# Patient Record
Sex: FEMALE | Race: WHITE | Employment: OTHER | URBAN - METROPOLITAN AREA
[De-identification: names, ages, dates, MRNs, and addresses within clinical notes are randomized per-mention and may not be internally consistent; named-entity substitution may affect disease eponyms.]

---

## 2018-01-15 ENCOUNTER — HOSPITAL ENCOUNTER (EMERGENCY)
Age: 23
Discharge: HOME | End: 2018-01-15
Attending: EMERGENCY MEDICINE
Payer: COMMERCIAL

## 2018-01-15 ENCOUNTER — APPOINTMENT (OUTPATIENT)
Dept: ULTRASOUND IMAGING | Age: 23
End: 2018-01-15
Attending: PHYSICIAN ASSISTANT
Payer: COMMERCIAL

## 2018-01-15 VITALS
DIASTOLIC BLOOD PRESSURE: 77 MMHG | SYSTOLIC BLOOD PRESSURE: 154 MMHG | BODY MASS INDEX: 30.06 KG/M2 | HEART RATE: 92 BPM | TEMPERATURE: 98.1 F | OXYGEN SATURATION: 100 % | RESPIRATION RATE: 16 BRPM | HEIGHT: 62 IN | WEIGHT: 163.36 LBS

## 2018-01-15 DIAGNOSIS — R10.11 RUQ ABDOMINAL PAIN: Primary | ICD-10-CM

## 2018-01-15 LAB
ALBUMIN SERPL-MCNC: 3.9 G/DL (ref 3.5–5)
ALBUMIN/GLOB SERPL: 1 {RATIO} (ref 1.1–2.2)
ALP SERPL-CCNC: 73 U/L (ref 45–117)
ALT SERPL-CCNC: 20 U/L (ref 12–78)
ANION GAP SERPL CALC-SCNC: 6 MMOL/L (ref 5–15)
APPEARANCE UR: CLEAR
AST SERPL-CCNC: 19 U/L (ref 15–37)
BACTERIA URNS QL MICRO: ABNORMAL /HPF
BASOPHILS # BLD: 0 K/UL (ref 0–0.1)
BASOPHILS NFR BLD: 0 % (ref 0–1)
BILIRUB SERPL-MCNC: 0.3 MG/DL (ref 0.2–1)
BILIRUB UR QL: NEGATIVE
BUN SERPL-MCNC: 9 MG/DL (ref 6–20)
BUN/CREAT SERPL: 11 (ref 12–20)
CALCIUM SERPL-MCNC: 9.3 MG/DL (ref 8.5–10.1)
CHLORIDE SERPL-SCNC: 106 MMOL/L (ref 97–108)
CO2 SERPL-SCNC: 28 MMOL/L (ref 21–32)
COLOR UR: ABNORMAL
CREAT SERPL-MCNC: 0.84 MG/DL (ref 0.55–1.02)
EOSINOPHIL # BLD: 0.1 K/UL (ref 0–0.4)
EOSINOPHIL NFR BLD: 1 % (ref 0–7)
EPITH CASTS URNS QL MICRO: ABNORMAL /LPF
ERYTHROCYTE [DISTWIDTH] IN BLOOD BY AUTOMATED COUNT: 13.1 % (ref 11.5–14.5)
GLOBULIN SER CALC-MCNC: 3.8 G/DL (ref 2–4)
GLUCOSE SERPL-MCNC: 70 MG/DL (ref 65–100)
GLUCOSE UR STRIP.AUTO-MCNC: NEGATIVE MG/DL
HCG UR QL: NEGATIVE
HCT VFR BLD AUTO: 39.6 % (ref 35–47)
HGB BLD-MCNC: 13.4 G/DL (ref 11.5–16)
HGB UR QL STRIP: NEGATIVE
KETONES UR QL STRIP.AUTO: NEGATIVE MG/DL
LEUKOCYTE ESTERASE UR QL STRIP.AUTO: ABNORMAL
LIPASE SERPL-CCNC: 104 U/L (ref 73–393)
LYMPHOCYTES # BLD: 3.6 K/UL (ref 0.8–3.5)
LYMPHOCYTES NFR BLD: 41 % (ref 12–49)
MCH RBC QN AUTO: 29.5 PG (ref 26–34)
MCHC RBC AUTO-ENTMCNC: 33.8 G/DL (ref 30–36.5)
MCV RBC AUTO: 87 FL (ref 80–99)
MONOCYTES # BLD: 0.8 K/UL (ref 0–1)
MONOCYTES NFR BLD: 9 % (ref 5–13)
NEUTS SEG # BLD: 4.3 K/UL (ref 1.8–8)
NEUTS SEG NFR BLD: 49 % (ref 32–75)
NITRITE UR QL STRIP.AUTO: NEGATIVE
PH UR STRIP: 5.5 [PH] (ref 5–8)
PLATELET # BLD AUTO: 326 K/UL (ref 150–400)
POTASSIUM SERPL-SCNC: 4 MMOL/L (ref 3.5–5.1)
PROT SERPL-MCNC: 7.7 G/DL (ref 6.4–8.2)
PROT UR STRIP-MCNC: NEGATIVE MG/DL
RBC # BLD AUTO: 4.55 M/UL (ref 3.8–5.2)
RBC #/AREA URNS HPF: ABNORMAL /HPF (ref 0–5)
SODIUM SERPL-SCNC: 140 MMOL/L (ref 136–145)
SP GR UR REFRACTOMETRY: 1.02 (ref 1–1.03)
UA: UC IF INDICATED,UAUC: ABNORMAL
UROBILINOGEN UR QL STRIP.AUTO: 0.2 EU/DL (ref 0.2–1)
WBC # BLD AUTO: 8.7 K/UL (ref 3.6–11)
WBC URNS QL MICRO: ABNORMAL /HPF (ref 0–4)

## 2018-01-15 PROCEDURE — 76705 ECHO EXAM OF ABDOMEN: CPT

## 2018-01-15 PROCEDURE — 85025 COMPLETE CBC W/AUTO DIFF WBC: CPT | Performed by: EMERGENCY MEDICINE

## 2018-01-15 PROCEDURE — 81025 URINE PREGNANCY TEST: CPT | Performed by: EMERGENCY MEDICINE

## 2018-01-15 PROCEDURE — 80053 COMPREHEN METABOLIC PANEL: CPT | Performed by: EMERGENCY MEDICINE

## 2018-01-15 PROCEDURE — 36415 COLL VENOUS BLD VENIPUNCTURE: CPT | Performed by: EMERGENCY MEDICINE

## 2018-01-15 PROCEDURE — 81001 URINALYSIS AUTO W/SCOPE: CPT | Performed by: EMERGENCY MEDICINE

## 2018-01-15 PROCEDURE — 83690 ASSAY OF LIPASE: CPT | Performed by: EMERGENCY MEDICINE

## 2018-01-15 PROCEDURE — 87086 URINE CULTURE/COLONY COUNT: CPT | Performed by: EMERGENCY MEDICINE

## 2018-01-15 PROCEDURE — 99283 EMERGENCY DEPT VISIT LOW MDM: CPT

## 2018-01-15 RX ORDER — HYDROCODONE BITARTRATE AND ACETAMINOPHEN 5; 325 MG/1; MG/1
1 TABLET ORAL
Qty: 10 TAB | Refills: 0 | Status: SHIPPED | OUTPATIENT
Start: 2018-01-15

## 2018-01-15 RX ORDER — ONDANSETRON 4 MG/1
4 TABLET, ORALLY DISINTEGRATING ORAL
Qty: 20 TAB | Refills: 0 | Status: SHIPPED | OUTPATIENT
Start: 2018-01-15

## 2018-01-15 RX ORDER — OMEPRAZOLE 10 MG/1
10 CAPSULE, DELAYED RELEASE ORAL DAILY
Qty: 30 CAP | Refills: 0 | Status: SHIPPED | OUTPATIENT
Start: 2018-01-15

## 2018-01-15 NOTE — LETTER
[address] 
[facility] EMERGENCY DEPT 
[address] [place] [address] [address] [id number] [phone] Work/School Note Date: 1/15/2018 To Whom It May concern: 
 
Tea Jacinto was seen and treated today in the emergency room by the following provider(s): 
Attending Provider: Ren Marks DO Physician Assistant: Danielle Valerio. [place], [place]. Tea Jacinto may return to work on 1/18/2018. Sincerely, 
 
 
 
 
Danielle Valerio.  [place], [place]

## 2018-01-16 NOTE — ED NOTES
Discharge instructions reviewed with patient. Discharge instructions given to patient per [facility]. Patient able to return/verbalize discharge instructions. Copy of discharge instructions provided. Patient condition stable, respiratory status within normal limits, neuro status intact. Ambulatory out of ER, accompanied by family.

## 2018-01-16 NOTE — ED PROVIDER NOTES
EMERGENCY DEPARTMENT HISTORY AND PHYSICAL EXAM      Date: 1/15/2018  Patient Name: John Orozco    History of Presenting Illness     Chief Complaint   Patient presents with    Abdominal Pain     RUQ pain x one month with n/v/d; worse after eating     History Provided By: Patient    HPI: John Orozco, 25 y.o. female with no significant PMHx, presents ambulatory to the ED with cc of gradually worsening abdominal pain alongside nausea with associated emesis x one month. Pt informs her abdominal pain presents with a mild 4/10 intensity and an associated sore, aching sensation to the right upper quadrant of the abdomen. Pt reports associated nausea which is constant and associated emesis last night. Pt reports her pain is generally increased following PO. Pt notes nothing works to decrease her pain. Pt additionally informs of intermittent chills. Pt reports she has not been evaluated for the pain prior. Pt states while the pain has been present for the past month, it was significantly exacerbated last night. Of note, pt reports history of chronic constipation as a child. Pt reports her last BM was yesterday which was softer than baseline. Pt states her LMP was x December 2017 and reports her BCP makes her MP irregular. Pt specifically denies any fevers, chills, diarrhea, constipation, urinary complications, chest pain, or SOB. Social Hx: - EtOH; - Smoker; - Illicit Drugs    PCP: Trice Pearce MD    There are no other complaints, changes, or physical findings at this time. Current Outpatient Prescriptions   Medication Sig Dispense Refill    HYDROcodone-acetaminophen (NORCO) 5-325 mg per tablet Take 1 Tab by mouth every four (4) hours as needed for Pain. Max Daily Amount: 6 Tabs. 10 Tab 0    omeprazole (PRILOSEC) 10 mg capsule Take 1 Cap by mouth daily. 30 Cap 0    ondansetron (ZOFRAN ODT) 4 mg disintegrating tablet Take 1 Tab by mouth every eight (8) hours as needed for Nausea.  20 Tab 0    cefdinir (OMNICEF) 300 mg capsule Take 600 mg by mouth daily.  norgestimate-ethinyl estradiol (ORTHO-CYCLEN) 0.25-35 mg-mcg per tablet Take 1 Tab by mouth daily.  amoxicillin-clavulanate (AUGMENTIN) 875-125 mg per tablet Take 1 Tab by mouth two (2) times a day. 14 Tab 0     Past History     Past Medical History:  No past medical history on file. Past Surgical History:  No past surgical history on file. Family History:  No family history on file. Social History:  Social History   Substance Use Topics    Smoking status: Not on file    Smokeless tobacco: Not on file    Alcohol use Not on file     Allergies: Allergies   Allergen Reactions    Codeine Nausea and Vomiting       Review of Systems   Review of Systems   Constitutional: Positive for chills. Negative for activity change, appetite change, diaphoresis, fever and unexpected weight change. HENT: Negative for congestion, hearing loss, rhinorrhea, sinus pressure, sneezing, sore throat and trouble swallowing. Eyes: Negative for pain, redness, itching and visual disturbance. Respiratory: Negative for cough, shortness of breath and wheezing. Cardiovascular: Negative for chest pain, palpitations and leg swelling. Gastrointestinal: Positive for abdominal pain, nausea and vomiting. Negative for constipation and diarrhea. Genitourinary: Negative for dysuria, frequency, hematuria and urgency. Musculoskeletal: Negative for arthralgias, gait problem and myalgias. Skin: Negative for color change, pallor, rash and wound. Neurological: Negative for tremors, weakness, light-headedness, numbness and headaches. All other systems reviewed and are negative. Physical Exam   Physical Exam   Constitutional: She is oriented to person, place, and time. Vital signs are normal. She appears well-developed and well-nourished. No distress. 25 y.o. female in NAD  Communicates appropriately and in full sentences.  Normal vital signs    Appears comfortable in exam bed   HENT:   Head: Normocephalic and atraumatic. Eyes: Conjunctivae are normal. Pupils are equal, round, and reactive to light. Right eye exhibits no discharge. Left eye exhibits no discharge. Neck: Normal range of motion. Neck supple. No nuchal rigidity   Cardiovascular: Normal rate, regular rhythm and intact distal pulses. Pulmonary/Chest: Effort normal and breath sounds normal. No respiratory distress. She has no wheezes. Abdominal: Soft. Bowel sounds are normal. She exhibits no distension. There is no tenderness (none with light and deep palpation). There is no rigidity, no rebound, no guarding, no CVA tenderness, no tenderness at McBurney's point and negative Meléndez's sign. Negative Meléndez's sign   Musculoskeletal: Normal range of motion. She exhibits no edema, tenderness or deformity. No neurologic, motor, vascular, or compartment embarrassment observed on exam. No focal neurologic deficits. Neurological: She is alert and oriented to person, place, and time. Coordination normal.   Skin: Skin is warm and dry. No rash noted. She is not diaphoretic. No erythema. No pallor. Psychiatric: She has a normal mood and affect. Nursing note and vitals reviewed.     Diagnostic Study Results     Labs -     Recent Results (from the past 12 hour(s))   URINALYSIS W/ REFLEX CULTURE    Collection Time: 01/15/18  5:41 PM   Result Value Ref Range    Color YELLOW/STRAW      Appearance CLEAR CLEAR      Specific gravity 1.023 1.003 - 1.030      pH (UA) 5.5 5.0 - 8.0      Protein NEGATIVE  NEG mg/dL    Glucose NEGATIVE  NEG mg/dL    Ketone NEGATIVE  NEG mg/dL    Bilirubin NEGATIVE  NEG      Blood NEGATIVE  NEG      Urobilinogen 0.2 0.2 - 1.0 EU/dL    Nitrites NEGATIVE  NEG      Leukocyte Esterase TRACE (A) NEG      WBC 5-10 0 - 4 /hpf    RBC 5-10 0 - 5 /hpf    Epithelial cells FEW FEW /lpf    Bacteria 1+ (A) NEG /hpf    UA:UC IF INDICATED URINE CULTURE ORDERED (A) CNI     HCG URINE, QL    Collection Time: 01/15/18 5:41 PM   Result Value Ref Range    HCG urine, Ql. NEGATIVE  NEG     METABOLIC PANEL, COMPREHENSIVE    Collection Time: 01/15/18  5:57 PM   Result Value Ref Range    Sodium 140 136 - 145 mmol/L    Potassium 4.0 3.5 - 5.1 mmol/L    Chloride 106 97 - 108 mmol/L    CO2 28 21 - 32 mmol/L    Anion gap 6 5 - 15 mmol/L    Glucose 70 65 - 100 mg/dL    BUN 9 6 - 20 MG/DL    Creatinine 0.84 0.55 - 1.02 MG/DL    BUN/Creatinine ratio 11 (L) 12 - 20      GFR est AA >60 >60 ml/min/1.73m2    GFR est non-AA >60 >60 ml/min/1.73m2    Calcium 9.3 8.5 - 10.1 MG/DL    Bilirubin, total 0.3 0.2 - 1.0 MG/DL    ALT (SGPT) 20 12 - 78 U/L    AST (SGOT) 19 15 - 37 U/L    Alk. phosphatase 73 45 - 117 U/L    Protein, total 7.7 6.4 - 8.2 g/dL    Albumin 3.9 3.5 - 5.0 g/dL    Globulin 3.8 2.0 - 4.0 g/dL    A-G Ratio 1.0 (L) 1.1 - 2.2     CBC WITH AUTOMATED DIFF    Collection Time: 01/15/18  5:57 PM   Result Value Ref Range    WBC 8.7 3.6 - 11.0 K/uL    RBC 4.55 3.80 - 5.20 M/uL    HGB 13.4 11.5 - 16.0 g/dL    HCT 39.6 35.0 - 47.0 %    MCV 87.0 80.0 - 99.0 FL    MCH 29.5 26.0 - 34.0 PG    MCHC 33.8 30.0 - 36.5 g/dL    RDW 13.1 11.5 - 14.5 %    PLATELET 542 773 - 293 K/uL    NEUTROPHILS 49 32 - 75 %    LYMPHOCYTES 41 12 - 49 %    MONOCYTES 9 5 - 13 %    EOSINOPHILS 1 0 - 7 %    BASOPHILS 0 0 - 1 %    ABS. NEUTROPHILS 4.3 1.8 - 8.0 K/UL    ABS. LYMPHOCYTES 3.6 (H) 0.8 - 3.5 K/UL    ABS. MONOCYTES 0.8 0.0 - 1.0 K/UL    ABS. EOSINOPHILS 0.1 0.0 - 0.4 K/UL    ABS. BASOPHILS 0.0 0.0 - 0.1 K/UL   LIPASE    Collection Time: 01/15/18  5:57 PM   Result Value Ref Range    Lipase 104 73 - 393 U/L     Radiologic Studies -   US ABD LTD   Final Result          INDICATION:  RUQ pain, no fever, no elevated WBC      EXAM: US Abdomen Limited.     FINDINGS: Abdomen sonogram of the right upper quadrant is performed. The  gallbladder appears normal, with no stones, wall thickening or associated  tenderness. The bile ducts are not enlarged.  Liver demonstrates normal, uniform  echotexture. Pancreatic head is obscured by bowel gas. Right kidney appears  normal. There is no free fluid in the hepatorenal fossa.     IMPRESSION  IMPRESSION: Unremarkable right upper quadrant sonogram.     Medical Decision Making   I am the first provider for this patient. I reviewed the vital signs, available nursing notes, past medical history, past surgical history, family history and social history. Vital Signs-Reviewed the patient's vital signs. Patient Vitals for the past 12 hrs:   Temp Pulse Resp BP SpO2   01/15/18 1934 98.1 °F (36.7 °C) 92 16 154/77 100 %   01/15/18 1724 98.6 °F (37 °C) 88 18 146/75 100 %     Pulse Oximetry Analysis - 100% on RA    Cardiac Monitor:   Rate: 88 bpm  Rhythm: Normal Sinus Rhythm      Records Reviewed: Nursing Notes and Old Medical Records    Provider Notes (Medical Decision Making):   DDx: gallbladder disease, reflux, dehydration, electrolyte abnormality, poor diet choices, biliary sludge     Pt presents with RUQ pain, N/V x 1 month which is worse following meals. Will evaluate with labs and US of RUQ. Labs, VSS, and imaging unremarkable. Will discharge pt with close general surgery follow-up. Pt expresses understanding. Provided customary ED return precautions. ED Course:   Initial assessment performed. The patients presenting problems have been discussed, and they are in agreement with the care plan formulated and outlined with them. I have encouraged them to ask questions as they arise throughout their visit. Disposition:  DISCHARGE NOTE:    8:17 PM  The patient is ready for discharge. The patient signs, symptoms, diagnosis, and discharge instructions have been discussed and the patient has conveyed their understanding. The patient is to follow-up as reccommended or returned to the ER should their symptoms worsen. Plan has been discussed and patient is in agreement. PLAN:  1.    Current Discharge Medication List      START taking these medications    Details   HYDROcodone-acetaminophen (NORCO) 5-325 mg per tablet Take 1 Tab by mouth every four (4) hours as needed for Pain. Max Daily Amount: 6 Tabs. Qty: 10 Tab, Refills: 0    Associated Diagnoses: RUQ abdominal pain      omeprazole (PRILOSEC) 10 mg capsule Take 1 Cap by mouth daily. Qty: 30 Cap, Refills: 0      ondansetron (ZOFRAN ODT) 4 mg disintegrating tablet Take 1 Tab by mouth every eight (8) hours as needed for Nausea. Qty: 20 Tab, Refills: 0         STOP taking these medications       HYDROcodone-acetaminophen (LORTAB) 5-500 mg per tablet Comments:   Reason for Stopp[place].   Follow-up Information     Follow up With Details Comments Contact Info    Jose Antonio Cordova MD Schedule an appointment as soon as possible for a visit in 2 days As needed, If symptoms worsen, Possible further evaluation and treatment [place] [phone]      [facility] EMERGENCY DEPT Go to As needed, If symptoms worsen [address] [facility] [address] Candi Erickson MD Schedule an appointment as soon as possible for a visit in 2 days As needed, If symptoms worsen, Possible further evaluation and treatment [address] [facility] [address]  [phone]          Return to ED if worse     Diagnosis     Clinical Impression:   1. RUQ abdominal pain      Attestations: This note is prepared by Saumya Hernandez, acting as Scribe for ArsanisVIVIEN. ArsanisVIVIEN: The scribe's documentation has been prepared under my direction and personally reviewed by me in its entirety. I confirm that the note above accurately reflects all work, treatment, procedures, and medical decision making performed by me.

## 2018-01-16 NOTE — DISCHARGE INSTRUCTIONS
Abdominal Pain: Care Instructions  Your Care Instructions    Abdominal pain has many possible causes. Some aren't serious and get better on their own in a few days. Others need more testing and treatment. If your pain continues or gets worse, you need to be rechecked and may need more tests to find out what is wrong. You may need surgery to correct the problem. Don't ignore new symptoms, such as fever, nausea and vomiting, urination problems, pain that gets worse, and dizziness. These may be signs of a more serious problem. Your doctor may have recommended a follow-up visit in the next 8 to 12 hours. If you are not getting better, you may need more tests or treatment. The doctor has checked you carefully, but problems can develop later. If you notice any problems or new symptoms, get medical treatment right away. Follow-up care is a key part of your treatment and safety. Be sure to make and go to all appointments, and call your doctor if you are having problems. It's also a good idea to know your test results and keep a list of the medicines you take. How can you care for yourself at home? · Rest until you feel better. · To prevent dehydration, drink plenty of fluids, enough so that your urine is light yellow or clear like water. Choose water and other caffeine-free clear liquids until you feel better. If you have kidney, heart, or liver disease and have to limit fluids, talk with your doctor before you increase the amount of fluids you drink. · If your stomach is upset, eat mild foods, such as rice, dry toast or crackers, bananas, and applesauce. Try eating several small meals instead of two or three large ones. · Wait until 48 hours after all symptoms have gone away before you have spicy foods, alcohol, and drinks that contain caffeine. · Do not eat foods that are high in fat. · Avoid anti-inflammatory medicines such as aspirin, ibuprofen (Advil, Motrin), and naproxen (Aleve).  These can cause stomach upset. Talk to your doctor if you take daily aspirin for another health problem. When should you call for help? Call 911 anytime you think you may need emergency care. For example, call if:  ? · You passed out (lost consciousness). ? · You pass maroon or very bloody stools. ? · You vomit blood or what looks like coffee grounds. ? · You have new, severe belly pain. ?Call your doctor now or seek immediate medical care if:  ? · Your pain gets worse, especially if it becomes focused in one area of your belly. ? · You have a new or higher fever. ? · Your stools are black and look like tar, or they have streaks of blood. ? · You have unexpected vaginal bleeding. ? · You have symptoms of a urinary tract infection. These may include:  ¨ Pain when you urinate. ¨ Urinating more often than usual.  ¨ Blood in your urine. ? · You are dizzy or lightheaded, or you feel like you may faint. ? Watch closely for changes in your health, and be sure to contact your doctor if:  ? · You are not getting better after 1 day (24 hours). Where can you learn more? Go to http://tiffany-esequiel.info/. Enter W296 in the search box to learn more about \"Abdominal Pain: Care Instructions. \"  Current as of: March 20, 2017  Content Version: 11.4  © 8688-9055 [employer]. Care instructions adapted under license by [employer] (which disclaims liability or warranty for this information). If you have questions about a medical condition or this instruction, always ask your healthcare professional. [place] [address] any warranty or liability for your use of this information.

## 2018-01-16 NOTE — ED NOTES
Assumed care of patient. Patient presents with chief complaint of abdominal pain. Patient states she has had diffuse abdominal pain with increased pain to RUQ after eating/drinking. Patient states she has been experiencing this pain since Saturday and has also been experiencing nausea/vomiting for 1 month. Patient states her last episode of vomiting was last evening and also reports chill, but did not take her temperature to determine if she had a fever. Patient is alert and oriented x4, respirations even and unlabored, VSS. Call bell within reach. Family at bedside.

## 2018-01-17 LAB
BACTERIA SPEC CULT: NORMAL
CC UR VC: NORMAL
SERVICE CMNT-IMP: NORMAL

## 2019-07-21 ENCOUNTER — HOSPITAL ENCOUNTER (EMERGENCY)
Age: 24
Discharge: HOME | End: 2019-07-21
Attending: EMERGENCY MEDICINE
Payer: COMMERCIAL

## 2019-07-21 VITALS
SYSTOLIC BLOOD PRESSURE: 107 MMHG | HEART RATE: 100 BPM | DIASTOLIC BLOOD PRESSURE: 84 MMHG | WEIGHT: 176.37 LBS | RESPIRATION RATE: 17 BRPM | BODY MASS INDEX: 32.46 KG/M2 | OXYGEN SATURATION: 100 % | TEMPERATURE: 97.9 F | HEIGHT: 62 IN

## 2019-07-21 DIAGNOSIS — T78.40XA ALLERGIC REACTION, INITIAL ENCOUNTER: Primary | ICD-10-CM

## 2019-07-21 PROCEDURE — 96375 TX/PRO/DX INJ NEW DRUG ADDON: CPT

## 2019-07-21 PROCEDURE — 74011000250 HC RX REV CODE- 250: Performed by: EMERGENCY MEDICINE

## 2019-07-21 PROCEDURE — 74011250637 HC RX REV CODE- 250/637: Performed by: EMERGENCY MEDICINE

## 2019-07-21 PROCEDURE — 74011250636 HC RX REV CODE- 250/636: Performed by: EMERGENCY MEDICINE

## 2019-07-21 PROCEDURE — 96374 THER/PROPH/DIAG INJ IV PUSH: CPT

## 2019-07-21 PROCEDURE — 99284 EMERGENCY DEPT VISIT MOD MDM: CPT

## 2019-07-21 PROCEDURE — 96361 HYDRATE IV INFUSION ADD-ON: CPT

## 2019-07-21 RX ORDER — DIPHENHYDRAMINE HCL 25 MG
25 CAPSULE ORAL
Qty: 20 CAP | Refills: 0 | Status: SHIPPED | OUTPATIENT
Start: 2019-07-21 | End: 2019-07-31

## 2019-07-21 RX ORDER — FAMOTIDINE 20 MG/1
20 TABLET, FILM COATED ORAL 2 TIMES DAILY
Qty: 20 TAB | Refills: 0 | Status: SHIPPED | OUTPATIENT
Start: 2019-07-21 | End: 2019-07-31

## 2019-07-21 RX ORDER — PAROXETINE 10 MG/1
20 TABLET, FILM COATED ORAL DAILY
COMMUNITY

## 2019-07-21 RX ORDER — PREDNISONE 50 MG/1
50 TABLET ORAL DAILY
Qty: 3 TAB | Refills: 0 | Status: SHIPPED | OUTPATIENT
Start: 2019-07-21 | End: 2019-07-24

## 2019-07-21 RX ORDER — DIPHENHYDRAMINE HYDROCHLORIDE 50 MG/ML
25 INJECTION, SOLUTION INTRAMUSCULAR; INTRAVENOUS
Status: COMPLETED | OUTPATIENT
Start: 2019-07-21 | End: 2019-07-21

## 2019-07-21 RX ORDER — LORAZEPAM 1 MG/1
1 TABLET ORAL
Status: COMPLETED | OUTPATIENT
Start: 2019-07-21 | End: 2019-07-21

## 2019-07-21 RX ADMIN — FAMOTIDINE 20 MG: 10 INJECTION, SOLUTION INTRAVENOUS at 01:57

## 2019-07-21 RX ADMIN — LORAZEPAM 1 MG: 1 TABLET ORAL at 03:30

## 2019-07-21 RX ADMIN — METHYLPREDNISOLONE SODIUM SUCCINATE 125 MG: 125 INJECTION, POWDER, FOR SOLUTION INTRAMUSCULAR; INTRAVENOUS at 01:57

## 2019-07-21 RX ADMIN — DIPHENHYDRAMINE HYDROCHLORIDE 25 MG: 50 INJECTION, SOLUTION INTRAMUSCULAR; INTRAVENOUS at 01:57

## 2019-07-21 RX ADMIN — SODIUM CHLORIDE 1000 ML: 900 INJECTION, SOLUTION INTRAVENOUS at 01:57

## 2019-07-21 NOTE — ED NOTES
ED visit d/t allergic reaction, nuts - pt reports eating food with nuts without knowing - event occurred 30 mins PTA - pt took benadryl x3, - pt also reports vomiting en route into the ED - reports relief at this time     Pt's boyfriend at bedside for comfort and care;;

## 2019-07-21 NOTE — ED NOTES
Patient discharge by Heike Temple MD - pt sent to the front lobby, with strong and steady gait -  Discharge information / home RX / and reasons to return to the ED were reviewed by the doctor.

## 2019-07-21 NOTE — ED NOTES
Pt with strong and steady gait to and from the bathroom  Family members remain at bedside for comfort and care;

## 2019-07-21 NOTE — DISCHARGE INSTRUCTIONS
Patient Education        Allergic Reaction: Care Instructions  Your Care Instructions    An allergic reaction is an excessive response from your immune system to a medicine, chemical, food, insect bite, or other substance. A reaction can range from mild to life-threatening. Some people have a mild rash, hives, and itching or stomach cramps. In severe reactions, swelling of your tongue and throat can close up your airway so that you cannot breathe. Follow-up care is a key part of your treatment and safety. Be sure to make and go to all appointments, and call your doctor if you are having problems. It's also a good idea to know your test results and keep a list of the medicines you take. How can you care for yourself at home? · If you know what caused your allergic reaction, be sure to avoid it. Your allergy may become more severe each time you have a reaction. · Take an over-the-counter antihistamine, such as cetirizine (Zyrtec) or loratadine (Claritin), to treat mild symptoms. Read and follow directions on the label. Some antihistamines can make you feel sleepy. Do not give antihistamines to a child unless you have checked with your doctor first. Mild symptoms include sneezing or an itchy or runny nose; an itchy mouth; a few hives or mild itching; and mild nausea or stomach discomfort. · Do not scratch hives or a rash. Put a cold, moist towel on them or take cool baths to relieve itching. Put ice packs on hives, swelling, or insect stings for 10 to 15 minutes at a time. Put a thin cloth between the ice pack and your skin. Do not take hot baths or showers. They will make the itching worse. · Your doctor may prescribe a shot of epinephrine to carry with you in case you have a severe reaction. Learn how to give yourself the shot and keep it with you at all times. Make sure it is not .   · Go to the emergency room every time you have a severe reaction, even if you have used your shot of epinephrine and are feeling better. Symptoms can come back after a shot. · Wear medical alert jewelry that lists your allergies. You can buy this at most drugstores. · If your child has a severe allergy, make sure that his or her teachers, babysitters, coaches, and other caregivers know about the allergy. They should have an epinephrine shot, know how and when to give it, and have a plan to take your child to the hospital.  When should you call for help? Give an epinephrine shot if:    · You think you are having a severe allergic reaction.     · You have symptoms in more than one body area, such as mild nausea and an itchy mouth.    After giving an epinephrine shot call 911, even if you feel better.   Call 911 if:    · You have symptoms of a severe allergic reaction. These may include:  ? Sudden raised, red areas (hives) all over your body. ? Swelling of the throat, mouth, lips, or tongue. ? Trouble breathing. ? Passing out (losing consciousness). Or you may feel very lightheaded or suddenly feel weak, confused, or restless.     · You have been given an epinephrine shot, even if you feel better.    Call your doctor now or seek immediate medical care if:    · You have symptoms of an allergic reaction, such as:  ? A rash or hives (raised, red areas on the skin). ? Itching. ? Swelling. ? Belly pain, nausea, or vomiting.    Watch closely for changes in your health, and be sure to contact your doctor if:    · You do not get better as expected. Where can you learn more? Go to http://tiffany-esequiel.info/. Enter N438 in the search box to learn more about \"Allergic Reaction: Care Instructions. \"  Current as of: January 21, 2019  Content Version: 12.1  © 1597-8271 [employer]. Care instructions adapted under license by [employer] (which disclaims liability or warranty for this information).  If you have questions about a medical condition or this instruction, always ask your healthcare professional. [address] any warranty or liability for your use of this information.

## 2019-07-21 NOTE — ED PROVIDER NOTES
EMERGENCY DEPARTMENT HISTORY AND PHYSICAL EXAM      Date: 7/21/2019  Patient Name: Evelyne Hobbs    Please note that this dictation was completed with [employer], the computer voice recognition software. Quite often unanticipated grammatical, syntax, homophones, and other interpretive errors are inadvertently transcribed by the computer software. Please disregard these errors. Please excuse any errors that have escaped final proofreading. History of Presenting Illness     Chief Complaint   Patient presents with    Allergic Reaction     reports allergy to nuts, possibly ate something containing nuts. did not use epipen PTA. History Provided By: Patient    HPI: Evelyne Hobbs, 26 y.o. female, presented the emergency department complaining of allergic reaction. Patient states she has an allergy to nuts, she ate some cheesecake this evening and symptoms started soon after with vomiting, nausea, throat swelling and shortness of breath. She took 325 mg Benadryl, but may have vomited some of them up. She reports that symptoms did start to improve after that. She did not use her EpiPen. No other exacerbating or relieving factors. No other associated symptoms. Patient was in a normal state of health until the symptoms started tonight. PCP: Chadwick Urbina MD    No current facility-administered medications on file prior to encounter. Current Outpatient Medications on File Prior to Encounter   Medication Sig Dispense Refill    PARoxetine (PAXIL) 10 mg tablet Take 20 mg by mouth daily.  HYDROcodone-acetaminophen (NORCO) 5-325 mg per tablet Take 1 Tab by mouth every four (4) hours as needed for Pain. Max Daily Amount: 6 Tabs. 10 Tab 0    omeprazole (PRILOSEC) 10 mg capsule Take 1 Cap by mouth daily. 30 Cap 0    ondansetron (ZOFRAN ODT) 4 mg disintegrating tablet Take 1 Tab by mouth every eight (8) hours as needed for Nausea. 20 Tab 0    cefdinir (OMNICEF) 300 mg capsule Take 600 mg by mouth daily.  norgestimate-ethinyl estradiol (ORTHO-CYCLEN) 0.25-35 mg-mcg per tablet Take 1 Tab by mouth daily.  amoxicillin-clavulanate (AUGMENTIN) 875-125 mg per tablet Take 1 Tab by mouth two (2) times a day. 14 Tab 0       Past History     Past Medical History:  History reviewed. No pertinent past medical history. Past Surgical History:  Past Surgical History:   Procedure Laterality Date    HX TONSIL AND ADENOIDECTOMY         Family History:  History reviewed. No pertinent family history. Social History:  Social History     Tobacco Use    Smoking status: Never Smoker    Smokeless tobacco: Never Used   Substance Use Topics    Alcohol use: No    Drug use: No       Allergies: Allergies   Allergen Reactions    Pecan Nut Anaphylaxis    Codeine Nausea and Vomiting         Review of Systems   Review of Systems   Constitutional: Negative for chills and fever. HENT: Positive for trouble swallowing and voice change. Negative for congestion and sore throat. Eyes: Negative for visual disturbance. Respiratory: Positive for shortness of breath. Negative for cough. Cardiovascular: Negative for chest pain and leg swelling. Gastrointestinal: Positive for nausea and vomiting. Negative for abdominal pain, blood in stool and diarrhea. Endocrine: Negative for polyuria. Genitourinary: Negative for dysuria, flank pain, vaginal bleeding and vaginal discharge. Musculoskeletal: Negative for myalgias. Skin: Negative for rash. Allergic/Immunologic: Negative for immunocompromised state. Neurological: Negative for weakness and headaches. Psychiatric/Behavioral: Negative for confusion. Physical Exam   Physical Exam   Constitutional: oriented to person, place, and time. appears well-developed and well-nourished. HENT:   Head: Normocephalic and atraumatic. Moist mucous membranes. No lip or tongue swelling, uvula is edematous.   Normal phonation, no trismus  Eyes: Pupils are equal, round, and reactive to light. Conjunctivae are normal. Right eye exhibits no discharge. Left eye exhibits no discharge. Neck: Normal range of motion. Neck supple. No tracheal deviation present. Cardiovascular: Tachycardic rate, regular rhythm and normal heart sounds. No murmur heard. Pulmonary/Chest: Effort normal and breath sounds normal. No respiratory distress. no wheezes. no rales. No stridor  Abdominal: Soft. Bowel sounds are normal. There is no tenderness. There is no rebound and no guarding. Musculoskeletal: Normal range of motion. exhibits no edema, tenderness or deformity. Neurological: alert and oriented to person, place, and time. Skin: Skin is warm and dry. No rash noted. No erythema. Psychiatric: behavior is normal.   Nursing note and vitals reviewed. Diagnostic Study Results     Labs -   No results found for this or any previous visit (from the past 12 hour(s)). Radiologic Studies -   No orders to display     CT Results  (Last 48 hours)    None        CXR Results  (Last 48 hours)    None            Medical Decision Making   I am the first provider for this patient. I reviewed the vital signs, available nursing notes, past medical history, past surgical history, family history and social history. Vital Signs-Reviewed the patient's vital signs.   Patient Vitals for the past 12 hrs:   Temp Pulse Resp BP SpO2   07/21/19 0230 -- 100 17 107/84 100 %   07/21/19 0215 -- 96 19 -- 100 %   07/21/19 0205 -- 98 19 -- 100 %   07/21/19 0200 -- 82 16 (!) 141/98 98 %   07/21/19 0155 -- -- -- -- 98 %   07/21/19 0150 -- -- -- -- 99 %   07/21/19 0145 -- 90 16 148/79 99 %   07/21/19 0140 -- -- -- -- 100 %   07/21/19 0135 -- -- -- -- 100 %   07/21/19 0120 97.9 °F (36.6 °C) (!) 107 28 (!) 135/94 100 %       Pulse Oximetry Analysis -100% on room air    Cardiac Monitor:   Sinus tachycardia, rate 107        Records Reviewed: Nursing Notes and Old Medical Records    Provider Notes (Medical Decision Making): Consistent with allergic reaction    ED Course:   Initial assessment performed. The patients presenting problems have been discussed, and they are in agreement with the care plan formulated and outlined with them. I have encouraged them to ask questions as they arise throughout their visit. ED Course as of Jul 21 0419   Sun Jul 21, 2019   0321 Patient feeling better, tolerating p.o., uvula still mildly edematous, discharged home on Pepcid, steroids. Patient has EpiPen at home, patient educated on when to use EpiPen, advised that her symptoms tonight warranted the use of her EpiPen. Patient voiced understanding states next time she will use EpiPen and come to the emergency department.    [AR]      ED Course User Index  [AR] Merlin Manual, DO         Critical Care Time:   none    Disposition:  DISCHARGE NOTE  Patients results have been reviewed with them. Patient and/or family have verbally conveyed their understanding and agreement of the patient's signs, symptoms, diagnosis, treatment and prognosis and additionally agree to follow up as recommended or return to the Emergency Room should their condition change or have any new concerns prior to their follow-up appointment. Patient verbally agrees with the care-plan and verbally conveys that all of their questions have been answered. Discharge instructions have also been provided to the patient with some educational information regarding their diagnosis as well a list of reasons why they would want to return to the ER prior to their follow-up appointment should their condition change. PLAN:  1. Discharge Medication List as of 7/21/2019  3:24 AM      START taking these medications    Details   famotidine (PEPCID) 20 mg tablet Take 1 Tab by mouth two (2) times a day for 10 days. , Normal, Disp-20 Tab, R-0      diphenhydrAMINE (BENADRYL) 25 mg capsule Take 1 Cap by mouth every six (6) hours as needed for Itching (Allergic reaction) for up to 10 days. , Normal, Disp-20 Cap, R-0      predniSONE (DELTASONE) 50 mg tablet Take 1 Tab by mouth daily for 3 days. , Normal, Disp-3 Tab, R-0         CONTINUE these medications which have NOT CHANGED    Details   PARoxetine (PAXIL) 10 mg tablet Take 20 mg by mouth daily. , Historical Med      HYDROcodone-acetaminophen (NORCO) 5-325 mg per tablet Take 1 Tab by mouth every four (4) hours as needed for Pain. Max Daily Amount: 6 Tabs., Print, Disp-10 Tab, R-0      omeprazole (PRILOSEC) 10 mg capsule Take 1 Cap by mouth daily. , Normal, Disp-30 Cap, R-0      ondansetron (ZOFRAN ODT) 4 mg disintegrating tablet Take 1 Tab by mouth every eight (8) hours as needed for Nausea., Normal, Disp-20 Tab, R-0      cefdinir (OMNICEF) 300 mg capsule Take 600 mg by mouth daily. Historical Med, 600 mg      norgestimate-ethinyl estradiol (ORTHO-CYCLEN) 0.25-35 mg-mcg per tablet Take 1 Tab by mouth daily. Historical Med, 1 Tab      amoxicillin-clavulanate (AUGMENTIN) 875-125 mg per tablet Take 1 Tab by mouth two (2) times a day. Print, 1 Tab, Disp-14 Tab, R-0           2. Follow-up Information     Follow up With Specialties Details Why Contact Info    Jered Jhaveri MD Family Practice Schedule an appointment as soon as possible for a visit  [place] [phone]      [facility] EMERGENCY DEPT Emergency Medicine  If symptoms worsen [address] [place] [address]  [phone]          Return to ED if worse     Diagnosis     Clinical Impression:   1. Allergic reaction, initial encounter        Attestations:   This note was completed by Bijal Dye DO

## 2022-04-15 LAB
ANTIBODY SCREEN, EXTERNAL: NEGATIVE
CHLAMYDIA, EXTERNAL: NEGATIVE
HBSAG, EXTERNAL: NEGATIVE
HEPATITIS C AB,   EXT: NEGATIVE
HIV, EXTERNAL: NON REACTIVE
N. GONORRHEA, EXTERNAL: NEGATIVE
RUBELLA, EXTERNAL: NORMAL
T. PALLIDUM, EXTERNAL: NON REACTIVE
TYPE, ABO & RH, EXTERNAL: NORMAL

## 2022-11-03 ENCOUNTER — HOSPITAL ENCOUNTER (EMERGENCY)
Age: 27
Discharge: HOME | End: 2022-11-03
Attending: OBSTETRICS & GYNECOLOGY | Admitting: OBSTETRICS & GYNECOLOGY

## 2022-11-03 ENCOUNTER — HOSPITAL ENCOUNTER (EMERGENCY)
Age: 27
Discharge: STILL ADMITTED OR ADMINISTRATIVE | End: 2022-11-03

## 2022-11-03 VITALS
HEART RATE: 101 BPM | SYSTOLIC BLOOD PRESSURE: 137 MMHG | DIASTOLIC BLOOD PRESSURE: 80 MMHG | OXYGEN SATURATION: 99 % | HEIGHT: 62 IN | WEIGHT: 194 LBS | TEMPERATURE: 98 F | BODY MASS INDEX: 35.7 KG/M2

## 2022-11-03 PROCEDURE — 99282 EMERGENCY DEPT VISIT SF MDM: CPT

## 2022-11-03 RX ORDER — FLUOXETINE HYDROCHLORIDE 20 MG/1
CAPSULE ORAL DAILY
COMMUNITY

## 2022-11-03 RX ORDER — VALACYCLOVIR HYDROCHLORIDE 500 MG/1
500 TABLET, FILM COATED ORAL DAILY
COMMUNITY
End: 2022-11-06

## 2022-11-03 NOTE — PROCEDURES
Non-Stress Test    Brief history, indication:  38 week IUP, contractions    Findings:  Baseline  bpm; moderate variability; greater than two accelerations to 150 bpm; no significant decelerations noted.     Result: Reactive NST    Nonstress test interpreted by myself      Sushma Ríos MD

## 2022-11-03 NOTE — H&P
OB History & Physical    Name: Jovanny Rodriguez MRN: [id number]  SSN: [id number]    YOB: 1995  Age: 33 y.o. Sex: female      Subjective:     Reason for Admission:  Pregnancy and contractions    History of Present Illness: Jovanny Rodriguez is a 33 y.o.  female with an estimated gestational age of 36w4d with Estimated Date of Delivery: 11/15/22. Patient complains of contractions since early this morning. She denies bleeding, ROM. The baby has been active. Problems with this pregnancy:  Hx HSV, no lesions, sx, valtrex suppression  Anxiety/depression---paxil    OB History          1    Para        Term                AB        Living             SAB        IAB        Ectopic        Molar        Multiple        Live Births                  Past Medical History:   Diagnosis Date    Genital herpes     Psychiatric problem      Past Surgical History:   Procedure Laterality Date    HX TONSIL AND ADENOIDECTOMY       Social History     Occupational History    Not on file   Tobacco Use    Smoking status: Never    Smokeless tobacco: Never   Vaping Use    Vaping Use: Never used   Substance and Sexual Activity    Alcohol use: No    Drug use: No    Sexual activity: Not on file     History reviewed. No pertinent family history. SH:  Patient denies tobacco, alcohol, recreational drugs. . FH: reviewed and negative    Review of systems:    General: Denies fever, sweats, chills  CV: Denies CP, palpitations  Resp: Denies SOB, cough  GI: Denies nausea, vomiting, diarrhea  : Denies dysura, abnormal frequency, hematuria  Neuro: Denies HA, visual disturbance  All other systems reviewed and are negative    Allergies   Allergen Reactions    Pecan Nut Anaphylaxis    Codeine Nausea and Vomiting     Prior to Admission medications    Medication Sig Start Date End Date Taking? Authorizing Provider   valACYclovir (Valtrex) 500 mg tablet Take 500 mg by mouth daily.    Yes Provider, Historical   FLUoxetine (PROzac) 20 mg capsule Take  by mouth daily. Yes Provider, Historical   PNV Comb #2-Iron-FA-Omega 3 29-1-400 mg cmpk Take  by mouth. Yes Provider, Historical   ondansetron (ZOFRAN ODT) 4 mg disintegrating tablet Take 1 Tab by mouth every eight (8) hours as needed for Nausea. 1/15/18  Yes CHARITY Hernandez   PARoxetine (PAXIL) 10 mg tablet Take 20 mg by mouth daily. Patient not taking: Reported on 11/3/2022    Other, MD Kash   HYDROcodone-acetaminophen (NORCO) 5-325 mg per tablet Take 1 Tab by mouth every four (4) hours as needed for Pain. Max Daily Amount: 6 Tabs. Patient not taking: Reported on 11/3/2022 1/15/18   CHARITY Fraser   omeprazole (PRILOSEC) 10 mg capsule Take 1 Cap by mouth daily. Patient not taking: Reported on 11/3/2022 1/15/18   CHARITY Fraser   cefdinir (OMNICEF) 300 mg capsule Take 600 mg by mouth daily. Patient not taking: Reported on 11/3/2022    Other, MD Kash   norgestimate-ethinyl estradiol (ORTHO-CYCLEN) 0.25-35 mg-mcg per tablet Take 1 Tab by mouth daily. Patient not taking: Reported on 11/3/2022    Jean Paul, MD Kash   amoxicillin-clavulanate (AUGMENTIN) 875-125 mg per tablet Take 1 Tab by mouth two (2) times a day.   Patient not taking: Reported on 11/3/2022 5/15/13   Prashant Braswell MD        Objective:     Vitals:    Vitals:    22 0548   BP: 137/80   Pulse: (!) 101   Temp: 98 °F (36.7 °C)   SpO2: 99%   Weight: 88 kg (194 lb)   Height: 5' 2\" (1.575 m)      Temp (24hrs), Av °F (36.7 °C), Min:98 °F (36.7 °C), Max:98 °F (36.7 °C)    BP  Min: 137/80  Max: 137/80     Physical Exam  General: in NAD, psychologically stable  Neck: normal ROM  Back: no CVAT  Heart: regular rate and rhythm  Respiratory: unlabored breathing  Abdomen: Gravid, soft, nontender, no abnormal masses, rebound, rigidity, guarding  Fundus: soft, nontender  Extremities: no abnormal cyanosis, clubbing, edema  Skin: warm, dry, no abnormal lesions noted  Neurological: DTR's 1-2+ no clonus  Pelvic:Cervical Exam: 2/70/-2/mid/soft/vertex  Uterine Activity: irregular contractions. mild  Membranes: no gross evidence of ROM  Fetal Heart Rate: adequate variability and reactivity; no significant abnormal decelerations    The patient's prenatal record is reviewed, including notes and diagnostic results, laboratory findings and ultrasound findings.     Assessment and Plan:     38 week IUP, false labor  Discharge home  Follow-up in the office as scheduled tomorrow  Precautions discussed; questions answered    Signed By:  Phil Cowden, MD     November 3, 2022

## 2022-11-03 NOTE — PROGRESS NOTES
[address]  Michael Ramírez is a 33 y.o.   at 38w2d patient of Dr Abdiel Patel at [facility] who presents to L&D triage with c/o contractions since 300. She reports Positive FM, denies vaginal bleeding and LOF. She also denies Headaches, Scotoma, RUQ pain, and Edema. Urine sample obtained. EFM and toco placed for initial assessment. 1634. Dr. Hershel Curling in room, strip reviewed, SVE 2, plan to discharge home with follow up with primary OBGYN.   8484. I have reviewed discharge instructions with the patient. The patient verbalized understanding plan to follow up with Dr. Abdiel Patel. 0143. Pt ambulated off the unit at this time.

## 2022-11-03 NOTE — DISCHARGE INSTRUCTIONS
Week 38 of Your Pregnancy: Care Instructions  Believe it or not, your baby is almost here. You may notice how your baby responds to sounds, warmth, cold, and light. You may even know what kind of music your baby likes. Even if you want a vaginal birth, it's a good idea to learn about  section ().  is the delivery of a baby through a cut (incision) in your belly. It's a major surgery, so it has more risks than a vaginal birth. During the first 2 weeks after the birth, limit visitors. Don't allow visitors who have colds or infections. Ask visitors to wash their hands before they touch your baby. And never let anyone smoke around your baby. Know about unplanned C-sections. Reasons for an unplanned  include labor that slows or stops, signs of distress in your baby, and high blood pressure or other problems for you. Know about planned C-sections. If your baby isn't in a head-down position for delivery (breech position), your doctor may plan a . Or you may have a planned  if you're having twins or more or if your baby weighs 9 pounds or more. Get as much help as you can while you're in the hospital.  You can learn about feeding, diapering, and bathing your baby. Talk to your doctor or midwife about how to care for the umbilical cord stump. If your baby will be circumcised, also ask about how to care for that. Ask friends or family for help, as you need it. If you can, have another adult in your home for at least 2 or 3 days after the birth. Try to nap when your baby naps. This may be your best chance to get some sleep. Watch for changes in your mental health. For the first 1 to 2 weeks after birth, it's common to cry or feel sad or irritable. If these feelings last for more than 2 weeks, you may have postpartum depression. Ask your doctor for help. Postpartum depression can be treated.    Follow-up care is a key part of your treatment and safety. Be sure to make and go to all appointments, and call your doctor if you are having problems. It's also a good idea to know your test results and keep a list of the medicines you take. Where can you learn more? Go to http://www.gray.com/  Enter B044 in the search box to learn more about \"Week 38 of Your Pregnancy: Care Instructions. \"  Current as of: February 23, 2022               Content Version: 13.4  © 2006-2022 [employer]. Care instructions adapted under license by [employer] (which disclaims liability or warranty for this information). If you have questions about a medical condition or this instruction, always ask your healthcare professional. [place] [address] any warranty or liability for your use of this information. Vaginal Childbirth: Care Instructions  Overview     Vaginal birth means delivering a baby through the birth canal (vagina). During labor, the uterus tightens (contracts) regularly to thin and open the cervix and to push the baby out through the birth canal.  Your body will slowly heal in the next few weeks. It's easy to get too tired and overwhelmed during the first weeks after your baby is born. Changes in your hormones can shift your mood without warning. You may find it hard to meet the extra demands on your energy and time. Take it easy on yourself. Follow-up care is a key part of your treatment and safety. Be sure to make and go to all appointments, and call your doctor if you are having problems. It's also a good idea to know your test results and keep a list of the medicines you take. How can you care for yourself at home? Vaginal bleeding and cramps  After delivery, you will have a bloody discharge from your vagina. This will turn pink within a week and then white or yellow after about 10 days. It may last for 2 to 4 weeks or longer, until the uterus has healed.  Use sanitary pads until you stop bleeding. Using pads makes it easier to monitor your bleeding. Don't worry if you pass some blood clots, as long as they are smaller than a golf ball. If you have a tear or stitches in your vaginal area, change the pad at least every 4 hours. This will help prevent soreness and infection. You may have cramps for the first few days after childbirth. These are normal and occur as the uterus shrinks to normal size. Take an over-the-counter pain medicine, such as acetaminophen (Tylenol), ibuprofen (Advil, Motrin), or naproxen (Aleve), for cramps. Read and follow all instructions on the label. Do not take aspirin, because it can cause more bleeding. Do not take two or more pain medicines at the same time unless the doctor told you to. Many pain medicines have acetaminophen, which is Tylenol. Too much acetaminophen (Tylenol) can be harmful. Stitches  If you have stitches, they will dissolve on their own and don't need to be removed. Follow your doctor's instructions for cleaning the stitched area. Put ice or a cold pack on your painful area for 10 to 20 minutes at a time, several times a day, for the first few days. Put a thin cloth between the ice and your skin. Sit in a few inches of warm water (sitz bath) 3 times a day and after bowel movements. The warm water helps with pain and itching. If you don't have a tub, a warm shower might help. Breast fullness  Your breasts may overfill (engorge) in the first few days after delivery. To help milk flow and to relieve pain, warm your breasts in the shower or by using warm, moist towels before nursing. If you aren't nursing, don't put warmth on your breasts or touch your breasts. Wear a bra that fits well and use ice until the fullness goes away. This usually takes 2 to 3 days. Put ice or a cold pack on your breast after nursing to reduce swelling and pain. Put a thin cloth between the ice and your skin. Activity  Eat a balanced diet.  Don't try to lose weight by cutting calories. Keep taking your prenatal vitamins, or take a multivitamin. Get as much rest as you can. Try to take naps when your baby sleeps during the day. Get some exercise every day. But don't do any heavy exercise until your doctor says it is okay. Wait until you are healed (about 4 to 6 weeks) before you have sexual intercourse. Your doctor will tell you when it is okay to have sex. If you don't want to get pregnant, talk to your doctor about birth control. You can get pregnant even before your period returns. Also, you can get pregnant while you are breastfeeding. Mental health  It's normal to have some sadness, anxiety, sleeplessness, and mood swings after you go home. If you feel upset or hopeless for more than a few days or are having trouble doing the things you need to do, talk to your doctor. Constipation and hemorrhoids  Drink plenty of fluids. If you have kidney, heart, or liver disease and have to limit fluids, talk with your doctor before you increase the amount of fluids you drink. Eat plenty of fiber each day. Have a bran muffin or bran cereal for breakfast. Try eating a piece of fruit for a mid-afternoon snack. For painful, itchy hemorrhoids, put ice or a cold pack on the area several times a day for 10 minutes at a time. Follow this by putting a warm compress on the area for another 10 to 20 minutes or by sitting in a shallow, warm bath. When should you call for help? Share this information with your partner, family, or a friend. They can help you watch for warning signs. Call 911  anytime you think you may need emergency care. For example, call if:    You have thoughts of harming yourself, your baby, or another person. You passed out (lost consciousness). You have chest pain, are short of breath, or cough up blood. You have a seizure.    Call your doctor now or seek immediate medical care if:    You have signs of hemorrhage (too much bleeding), such as:  Heavy vaginal bleeding. This means that you are soaking through one or more pads in an hour. Or you pass blood clots bigger than an egg. Feeling dizzy or lightheaded, or you feel like you may faint. Feeling so tired or weak that you cannot do your usual activities. A fast or irregular heartbeat. New or worse belly pain. You have signs of infection, such as:  A fever. Vaginal discharge that smells bad. New or worse belly pain. You have symptoms of a blood clot in your leg (called a deep vein thrombosis), such as:  Pain in the calf, back of the knee, thigh, or groin. Redness and swelling in your leg or groin. You have signs of preeclampsia, such as:  Sudden swelling of your face, hands, or feet. New vision problems (such as dimness, blurring, or seeing spots). A severe headache. Watch closely for changes in your health, and be sure to contact your doctor if:    Your vaginal bleeding isn't decreasing. You feel sad, anxious, or hopeless for more than a few days. You are having problems with your breasts or breastfeeding. Where can you learn more? Go to http://www.gray.com/  Enter Q237 in the search box to learn more about \"Vaginal Childbirth: Care Instructions. \"  Current as of: February 23, 2022               Content Version: 13.4  © 2006-2022 [employer]. Care instructions adapted under license by [employer] (which disclaims liability or warranty for this information). If you have questions about a medical condition or this instruction, always ask your healthcare professional. [place] [address] any warranty or liability for your use of this information. Belly Pain in Pregnancy: Care Instructions  Overview     When you're pregnant, any belly pain can be a worry. You may not want to call your doctor or midwife about every pain you have. But you don't want to miss something that is dangerous for you or your baby.   Even if it feels familiar, belly pain can mean something new when you're pregnant. It's important to know when to call your doctor or midwife. It will also help to know how to care for yourself at home when your pain is not caused by anything harmful. When belly pain is more severe or constant, see a doctor or midwife right away. If you're sure your belly pain is a sign of labor, call your doctor or midwife. When belly pain is brief, it's usually a normal part of pregnancy. It might be related to changes in the growing uterus. Or it could be the stretching of ligaments called round ligaments. These ligaments help support the uterus. Round ligament pain can be on either side of your belly. It can also be felt in your hips or groin. Follow-up care is a key part of your treatment and safety. Be sure to make and go to all appointments, and call your doctor if you are having problems. It's also a good idea to know your test results and keep a list of the medicines you take. How can you tell if belly pain is a sign of labor? When belly pain is caused by labor, it can feel like mild or menstrual-like cramps in your lower belly. These cramps are probably contractions. They can happen in your second or third trimester. You may also have:  A steady, dull ache in your lower back, pelvis, or thighs. A feeling of pressure in your pelvis or lower belly. Changes in your vaginal discharge or a sudden release of fluid from the vagina. If you think you are in labor, call your doctor. How can you care for yourself at home? When belly pain is mild and is not a symptom of labor:  Rest until you feel better. Take a warm bath. Think about what you drink and eat:  Drink plenty of fluids. Choose water and other clear liquids until you feel better. Try eating small, frequent meals. If your stomach is upset, try bland, low-fat foods like plain rice, broiled chicken, toast, and yogurt.   Think about how you move if you are having brief pains from stretching of the round ligaments. Try gentle stretching. Move a little more slowly when turning in bed or getting up from a chair, so those ligaments don't stretch quickly. Lean forward a bit if you think you are going to cough or sneeze. When should you call for help? Call 911  anytime you think you may need emergency care. For example, call if:    You have sudden, severe pain in your belly. You have severe vaginal bleeding. You passed out (lost consciousness). You have a seizure. Call your doctor now or seek immediate medical care if:    You have new or worse belly pain or cramping. You have any vaginal bleeding. You have a fever. You have symptoms of preeclampsia, such as:  Sudden swelling of your face, hands, or feet. New vision problems (such as dimness, blurring, or seeing spots). A severe headache. You think that you may be in labor. This means that you've had at least 8 contractions within 1 hour or at least 4 contractions within 20 minutes, even after you change your position and drink fluids. You have symptoms of a urinary tract infection. These may include:  Pain or burning when you urinate. A frequent need to urinate without being able to pass much urine. Pain in the flank, which is just below the rib cage and above the waist on either side of the back. Blood in your urine. Watch closely for changes in your health, and be sure to contact your doctor if you are worried about your or your baby's health. Where can you learn more? Go to http://tiffany-esequiel.info/  Enter B275 in the search box to learn more about \"Belly Pain in Pregnancy: Care Instructions. \"  Current as of: February 23, 2022               Content Version: 13.4  © 0339-1549 [employer]. Care instructions adapted under license by [employer] (which disclaims liability or warranty for this information).  If you have questions about a medical condition or this instruction, always ask your healthcare professional. [place] [address] any warranty or liability for your use of this information.

## 2022-11-04 ENCOUNTER — HOSPITAL ENCOUNTER (INPATIENT)
Age: 27
LOS: 2 days | Discharge: HOME | DRG: 540 | End: 2022-11-06
Attending: OBSTETRICS & GYNECOLOGY | Admitting: OBSTETRICS & GYNECOLOGY
Payer: MEDICAID

## 2022-11-04 ENCOUNTER — ANESTHESIA (OUTPATIENT)
Dept: LABOR AND DELIVERY | Age: 27
DRG: 540 | End: 2022-11-04
Payer: MEDICAID

## 2022-11-04 ENCOUNTER — ANESTHESIA EVENT (OUTPATIENT)
Dept: LABOR AND DELIVERY | Age: 27
DRG: 540 | End: 2022-11-04
Payer: MEDICAID

## 2022-11-04 DIAGNOSIS — Z98.891 S/P C-SECTION: Primary | ICD-10-CM

## 2022-11-04 PROBLEM — O42.90 PROM (PREMATURE RUPTURE OF MEMBRANES): Status: ACTIVE | Noted: 2022-11-04

## 2022-11-04 LAB
ABO + RH BLD: NORMAL
APPEARANCE UR: ABNORMAL
BACTERIA URNS QL MICRO: NEGATIVE /HPF
BILIRUB UR QL: NEGATIVE
BLOOD GROUP ANTIBODIES SERPL: NORMAL
COLOR UR: ABNORMAL
EPITH CASTS URNS QL MICRO: ABNORMAL /LPF
ERYTHROCYTE [DISTWIDTH] IN BLOOD BY AUTOMATED COUNT: 15.2 % (ref 11.5–14.5)
FIBRINOGEN PPP-MCNC: 575 MG/DL (ref 200–475)
GLUCOSE UR STRIP.AUTO-MCNC: NEGATIVE MG/DL
HCT VFR BLD AUTO: 34.9 % (ref 35–47)
HGB BLD-MCNC: 11.6 G/DL (ref 11.5–16)
HGB UR QL STRIP: ABNORMAL
KETONES UR QL STRIP.AUTO: 80 MG/DL
LEUKOCYTE ESTERASE UR QL STRIP.AUTO: ABNORMAL
MCH RBC QN AUTO: 30.1 PG (ref 26–34)
MCHC RBC AUTO-ENTMCNC: 33.2 G/DL (ref 30–36.5)
MCV RBC AUTO: 90.6 FL (ref 80–99)
NITRITE UR QL STRIP.AUTO: NEGATIVE
NRBC # BLD: 0 K/UL (ref 0–0.01)
NRBC BLD-RTO: 0 PER 100 WBC
PH UR STRIP: 6 [PH] (ref 5–8)
PLATELET # BLD AUTO: 371 K/UL (ref 150–400)
PMV BLD AUTO: 11.2 FL (ref 8.9–12.9)
PROT UR STRIP-MCNC: 100 MG/DL
RBC # BLD AUTO: 3.85 M/UL (ref 3.8–5.2)
RBC #/AREA URNS HPF: ABNORMAL /HPF (ref 0–5)
SP GR UR REFRACTOMETRY: 1.03 (ref 1–1.03)
SPECIMEN EXP DATE BLD: NORMAL
UROBILINOGEN UR QL STRIP.AUTO: 1 EU/DL (ref 0.2–1)
WBC # BLD AUTO: 16.4 K/UL (ref 3.6–11)
WBC URNS QL MICRO: ABNORMAL /HPF (ref 0–4)

## 2022-11-04 PROCEDURE — 74011000258 HC RX REV CODE- 258: Performed by: OBSTETRICS & GYNECOLOGY

## 2022-11-04 PROCEDURE — 74011250637 HC RX REV CODE- 250/637: Performed by: OBSTETRICS & GYNECOLOGY

## 2022-11-04 PROCEDURE — 74011250637 HC RX REV CODE- 250/637: Performed by: ANESTHESIOLOGY

## 2022-11-04 PROCEDURE — 36415 COLL VENOUS BLD VENIPUNCTURE: CPT

## 2022-11-04 PROCEDURE — 75410000002 HC LABOR FEE PER 1 HR

## 2022-11-04 PROCEDURE — 86900 BLOOD TYPING SEROLOGIC ABO: CPT

## 2022-11-04 PROCEDURE — 77010026065 HC OXYGEN MINIMUM MEDICAL AIR

## 2022-11-04 PROCEDURE — 74011250636 HC RX REV CODE- 250/636: Performed by: ANESTHESIOLOGY

## 2022-11-04 PROCEDURE — 74011250636 HC RX REV CODE- 250/636: Performed by: OBSTETRICS & GYNECOLOGY

## 2022-11-04 PROCEDURE — 76060000078 HC EPIDURAL ANESTHESIA

## 2022-11-04 PROCEDURE — 76060000078 HC EPIDURAL ANESTHESIA: Performed by: OBSTETRICS & GYNECOLOGY

## 2022-11-04 PROCEDURE — 81001 URINALYSIS AUTO W/SCOPE: CPT

## 2022-11-04 PROCEDURE — 77030014125 HC TY EPDRL BBMI -B: Performed by: ANESTHESIOLOGY

## 2022-11-04 PROCEDURE — 75410000003 HC RECOV DEL/VAG/CSECN EA 0.5 HR

## 2022-11-04 PROCEDURE — 65410000002 HC RM PRIVATE OB

## 2022-11-04 PROCEDURE — 77030003666 HC NDL SPINAL BD -A: Performed by: ANESTHESIOLOGY

## 2022-11-04 PROCEDURE — 76060000032 HC ANESTHESIA 0.5 TO 1 HR: Performed by: OBSTETRICS & GYNECOLOGY

## 2022-11-04 PROCEDURE — 74011000250 HC RX REV CODE- 250: Performed by: ANESTHESIOLOGY

## 2022-11-04 PROCEDURE — 85384 FIBRINOGEN ACTIVITY: CPT

## 2022-11-04 PROCEDURE — 74011000250 HC RX REV CODE- 250

## 2022-11-04 PROCEDURE — 85027 COMPLETE CBC AUTOMATED: CPT

## 2022-11-04 PROCEDURE — 76010000391 HC C SECN FIRST 1 HR: Performed by: OBSTETRICS & GYNECOLOGY

## 2022-11-04 RX ORDER — SODIUM CHLORIDE 0.9 % (FLUSH) 0.9 %
5-40 SYRINGE (ML) INJECTION AS NEEDED
Status: DISCONTINUED | OUTPATIENT
Start: 2022-11-04 | End: 2022-11-04

## 2022-11-04 RX ORDER — BUPIVACAINE HYDROCHLORIDE AND EPINEPHRINE 2.5; 5 MG/ML; UG/ML
INJECTION, SOLUTION EPIDURAL; INFILTRATION; INTRACAUDAL; PERINEURAL AS NEEDED
Status: DISCONTINUED | OUTPATIENT
Start: 2022-11-04 | End: 2022-11-04 | Stop reason: HOSPADM

## 2022-11-04 RX ORDER — SODIUM CHLORIDE 0.9 % (FLUSH) 0.9 %
5-40 SYRINGE (ML) INJECTION EVERY 8 HOURS
Status: DISCONTINUED | OUTPATIENT
Start: 2022-11-04 | End: 2022-11-04

## 2022-11-04 RX ORDER — SIMETHICONE 80 MG
80 TABLET,CHEWABLE ORAL AS NEEDED
Status: DISCONTINUED | OUTPATIENT
Start: 2022-11-04 | End: 2022-11-06 | Stop reason: HOSPADM

## 2022-11-04 RX ORDER — OXYTOCIN/RINGER'S LACTATE 30/500 ML
PLASTIC BAG, INJECTION (ML) INTRAVENOUS
Status: DISCONTINUED
Start: 2022-11-04 | End: 2022-11-04

## 2022-11-04 RX ORDER — WATER FOR INJECTION,STERILE
VIAL (ML) INJECTION
Status: DISCONTINUED
Start: 2022-11-04 | End: 2022-11-04 | Stop reason: WASHOUT

## 2022-11-04 RX ORDER — IBUPROFEN 400 MG/1
800 TABLET ORAL EVERY 8 HOURS
Status: DISCONTINUED | OUTPATIENT
Start: 2022-11-04 | End: 2022-11-06 | Stop reason: HOSPADM

## 2022-11-04 RX ORDER — ONDANSETRON 2 MG/ML
INJECTION INTRAMUSCULAR; INTRAVENOUS AS NEEDED
Status: DISCONTINUED | OUTPATIENT
Start: 2022-11-04 | End: 2022-11-04 | Stop reason: HOSPADM

## 2022-11-04 RX ORDER — CEFAZOLIN SODIUM 1 G/3ML
INJECTION, POWDER, FOR SOLUTION INTRAMUSCULAR; INTRAVENOUS
Status: COMPLETED
Start: 2022-11-04 | End: 2022-11-04

## 2022-11-04 RX ORDER — OXYTOCIN/RINGER'S LACTATE 30/500 ML
87.3 PLASTIC BAG, INJECTION (ML) INTRAVENOUS AS NEEDED
Status: DISCONTINUED | OUTPATIENT
Start: 2022-11-04 | End: 2022-11-04

## 2022-11-04 RX ORDER — LIDOCAINE HYDROCHLORIDE AND EPINEPHRINE 20; 5 MG/ML; UG/ML
INJECTION, SOLUTION EPIDURAL; INFILTRATION; INTRACAUDAL; PERINEURAL AS NEEDED
Status: DISCONTINUED | OUTPATIENT
Start: 2022-11-04 | End: 2022-11-04 | Stop reason: HOSPADM

## 2022-11-04 RX ORDER — OXYCODONE HYDROCHLORIDE 5 MG/1
5-10 TABLET ORAL
Status: DISCONTINUED | OUTPATIENT
Start: 2022-11-04 | End: 2022-11-06 | Stop reason: HOSPADM

## 2022-11-04 RX ORDER — NALOXONE HYDROCHLORIDE 0.4 MG/ML
0.4 INJECTION, SOLUTION INTRAMUSCULAR; INTRAVENOUS; SUBCUTANEOUS AS NEEDED
Status: DISCONTINUED | OUTPATIENT
Start: 2022-11-04 | End: 2022-11-06

## 2022-11-04 RX ORDER — ZOLPIDEM TARTRATE 5 MG/1
5 TABLET ORAL
Status: DISCONTINUED | OUTPATIENT
Start: 2022-11-04 | End: 2022-11-06 | Stop reason: HOSPADM

## 2022-11-04 RX ORDER — FLUOXETINE HYDROCHLORIDE 20 MG/1
20 CAPSULE ORAL DAILY
Status: DISCONTINUED | OUTPATIENT
Start: 2022-11-04 | End: 2022-11-06 | Stop reason: HOSPADM

## 2022-11-04 RX ORDER — FENTANYL/BUPIVACAINE/NS/PF 2-1250MCG
12 PREFILLED PUMP RESERVOIR EPIDURAL CONTINUOUS
Status: DISCONTINUED | OUTPATIENT
Start: 2022-11-04 | End: 2022-11-04

## 2022-11-04 RX ORDER — ONDANSETRON 4 MG/1
4 TABLET, ORALLY DISINTEGRATING ORAL
Status: DISCONTINUED | OUTPATIENT
Start: 2022-11-04 | End: 2022-11-04

## 2022-11-04 RX ORDER — EPHEDRINE SULFATE/0.9% NACL/PF 50 MG/5 ML
12.5 SYRINGE (ML) INTRAVENOUS
Status: DISCONTINUED | OUTPATIENT
Start: 2022-11-04 | End: 2022-11-04

## 2022-11-04 RX ORDER — PROCHLORPERAZINE MALEATE 5 MG
TABLET ORAL AS NEEDED
Status: DISCONTINUED | OUTPATIENT
Start: 2022-11-04 | End: 2022-11-04 | Stop reason: HOSPADM

## 2022-11-04 RX ORDER — VALACYCLOVIR HYDROCHLORIDE 500 MG/1
500 TABLET, FILM COATED ORAL EVERY 12 HOURS
Status: DISCONTINUED | OUTPATIENT
Start: 2022-11-04 | End: 2022-11-06 | Stop reason: HOSPADM

## 2022-11-04 RX ORDER — MORPHINE SULFATE 0.5 MG/ML
INJECTION, SOLUTION EPIDURAL; INTRATHECAL; INTRAVENOUS AS NEEDED
Status: DISCONTINUED | OUTPATIENT
Start: 2022-11-04 | End: 2022-11-04 | Stop reason: HOSPADM

## 2022-11-04 RX ORDER — SODIUM CHLORIDE, SODIUM LACTATE, POTASSIUM CHLORIDE, CALCIUM CHLORIDE 600; 310; 30; 20 MG/100ML; MG/100ML; MG/100ML; MG/100ML
125 INJECTION, SOLUTION INTRAVENOUS CONTINUOUS
Status: DISCONTINUED | OUTPATIENT
Start: 2022-11-04 | End: 2022-11-04

## 2022-11-04 RX ORDER — SODIUM CHLORIDE, SODIUM LACTATE, POTASSIUM CHLORIDE, CALCIUM CHLORIDE 600; 310; 30; 20 MG/100ML; MG/100ML; MG/100ML; MG/100ML
INJECTION, SOLUTION INTRAVENOUS
Status: DISCONTINUED | OUTPATIENT
Start: 2022-11-04 | End: 2022-11-04 | Stop reason: HOSPADM

## 2022-11-04 RX ORDER — BUPIVACAINE HYDROCHLORIDE AND EPINEPHRINE 2.5; 5 MG/ML; UG/ML
INJECTION, SOLUTION EPIDURAL; INFILTRATION; INTRACAUDAL; PERINEURAL
Status: COMPLETED
Start: 2022-11-04 | End: 2022-11-04

## 2022-11-04 RX ORDER — BUTORPHANOL TARTRATE 2 MG/ML
1 INJECTION INTRAMUSCULAR; INTRAVENOUS ONCE
Status: COMPLETED | OUTPATIENT
Start: 2022-11-04 | End: 2022-11-04

## 2022-11-04 RX ORDER — ONDANSETRON 2 MG/ML
4 INJECTION INTRAMUSCULAR; INTRAVENOUS
Status: DISCONTINUED | OUTPATIENT
Start: 2022-11-04 | End: 2022-11-04

## 2022-11-04 RX ORDER — FENTANYL/BUPIVACAINE/NS/PF 2-1250MCG
PREFILLED PUMP RESERVOIR EPIDURAL
Status: COMPLETED
Start: 2022-11-04 | End: 2022-11-04

## 2022-11-04 RX ORDER — TERBUTALINE SULFATE 1 MG/ML
0.25 INJECTION SUBCUTANEOUS
Status: COMPLETED | OUTPATIENT
Start: 2022-11-04 | End: 2022-11-04

## 2022-11-04 RX ORDER — KETOROLAC TROMETHAMINE 30 MG/ML
30 INJECTION, SOLUTION INTRAMUSCULAR; INTRAVENOUS
Status: DISCONTINUED | OUTPATIENT
Start: 2022-11-04 | End: 2022-11-06

## 2022-11-04 RX ORDER — OXYTOCIN/RINGER'S LACTATE 30/500 ML
10 PLASTIC BAG, INJECTION (ML) INTRAVENOUS AS NEEDED
Status: DISCONTINUED | OUTPATIENT
Start: 2022-11-04 | End: 2022-11-04

## 2022-11-04 RX ORDER — ACETAMINOPHEN 325 MG/1
650 TABLET ORAL
Status: DISCONTINUED | OUTPATIENT
Start: 2022-11-04 | End: 2022-11-06 | Stop reason: HOSPADM

## 2022-11-04 RX ORDER — BUPIVACAINE HYDROCHLORIDE 2.5 MG/ML
INJECTION, SOLUTION EPIDURAL; INFILTRATION; INTRACAUDAL AS NEEDED
Status: DISCONTINUED | OUTPATIENT
Start: 2022-11-04 | End: 2022-11-04 | Stop reason: HOSPADM

## 2022-11-04 RX ORDER — CEFAZOLIN SODIUM 1 G/3ML
INJECTION, POWDER, FOR SOLUTION INTRAMUSCULAR; INTRAVENOUS AS NEEDED
Status: DISCONTINUED | OUTPATIENT
Start: 2022-11-04 | End: 2022-11-04 | Stop reason: HOSPADM

## 2022-11-04 RX ADMIN — SODIUM CHLORIDE, POTASSIUM CHLORIDE, SODIUM LACTATE AND CALCIUM CHLORIDE 1000 ML: 600; 310; 30; 20 INJECTION, SOLUTION INTRAVENOUS at 06:24

## 2022-11-04 RX ADMIN — VALACYCLOVIR HYDROCHLORIDE 500 MG: 500 TABLET, FILM COATED ORAL at 22:15

## 2022-11-04 RX ADMIN — Medication 12 ML/HR: at 08:39

## 2022-11-04 RX ADMIN — TERBUTALINE SULFATE 0.25 MG: 1 INJECTION SUBCUTANEOUS at 11:43

## 2022-11-04 RX ADMIN — LIDOCAINE HYDROCHLORIDE,EPINEPHRINE BITARTRATE 5 ML: 20; .005 INJECTION, SOLUTION EPIDURAL; INFILTRATION; INTRACAUDAL; PERINEURAL at 11:45

## 2022-11-04 RX ADMIN — BUTORPHANOL TARTRATE 1 MG: 2 INJECTION, SOLUTION INTRAMUSCULAR; INTRAVENOUS at 06:18

## 2022-11-04 RX ADMIN — Medication 3 MG: at 12:41

## 2022-11-04 RX ADMIN — SODIUM CHLORIDE, SODIUM LACTATE, POTASSIUM CHLORIDE, AND CALCIUM CHLORIDE 125 ML/HR: 600; 310; 30; 20 INJECTION, SOLUTION INTRAVENOUS at 08:45

## 2022-11-04 RX ADMIN — BUPIVACAINE HYDROCHLORIDE AND EPINEPHRINE 0.8 ML: 2.5; 5 INJECTION, SOLUTION EPIDURAL; INFILTRATION; INTRACAUDAL; PERINEURAL at 08:33

## 2022-11-04 RX ADMIN — ONDANSETRON HYDROCHLORIDE 4 MG: 2 INJECTION, SOLUTION INTRAMUSCULAR; INTRAVENOUS at 11:55

## 2022-11-04 RX ADMIN — FLUOXETINE 20 MG: 20 CAPSULE ORAL at 22:15

## 2022-11-04 RX ADMIN — LIDOCAINE HYDROCHLORIDE,EPINEPHRINE BITARTRATE 5 ML: 20; .005 INJECTION, SOLUTION EPIDURAL; INFILTRATION; INTRACAUDAL; PERINEURAL at 11:47

## 2022-11-04 RX ADMIN — SODIUM CHLORIDE 5 MILLION UNITS: 900 INJECTION INTRAVENOUS at 10:21

## 2022-11-04 RX ADMIN — BUPIVACAINE HYDROCHLORIDE 5 ML: 2.5 INJECTION, SOLUTION EPIDURAL; INFILTRATION; INTRACAUDAL at 08:36

## 2022-11-04 RX ADMIN — SODIUM CHLORIDE, POTASSIUM CHLORIDE, SODIUM LACTATE AND CALCIUM CHLORIDE: 600; 310; 30; 20 INJECTION, SOLUTION INTRAVENOUS at 11:51

## 2022-11-04 RX ADMIN — ONDANSETRON 4 MG: 4 TABLET, ORALLY DISINTEGRATING ORAL at 06:24

## 2022-11-04 RX ADMIN — SODIUM CHLORIDE, POTASSIUM CHLORIDE, SODIUM LACTATE AND CALCIUM CHLORIDE: 600; 310; 30; 20 INJECTION, SOLUTION INTRAVENOUS at 11:45

## 2022-11-04 RX ADMIN — PROCHLORPERAZINE MALEATE 10 MG: 5 TABLET, FILM COATED ORAL at 11:59

## 2022-11-04 RX ADMIN — AZITHROMYCIN MONOHYDRATE 500 MG: 500 INJECTION, POWDER, LYOPHILIZED, FOR SOLUTION INTRAVENOUS at 12:00

## 2022-11-04 RX ADMIN — SODIUM CHLORIDE, POTASSIUM CHLORIDE, SODIUM LACTATE AND CALCIUM CHLORIDE 125 ML/HR: 600; 310; 30; 20 INJECTION, SOLUTION INTRAVENOUS at 12:40

## 2022-11-04 RX ADMIN — CEFAZOLIN 2 G: 1 INJECTION, POWDER, FOR SOLUTION INTRAMUSCULAR; INTRAVENOUS; PARENTERAL at 11:50

## 2022-11-04 RX ADMIN — IBUPROFEN 800 MG: 400 TABLET, FILM COATED ORAL at 19:49

## 2022-11-04 NOTE — PROGRESS NOTES
Problem: Vaginal Delivery: Day of Deliver-Laboring  Goal: Activity/Safety  Outcome: Progressing Towards Goal  Goal: Nutrition/Diet  Outcome: Progressing Towards Goal  Goal: Medications  Outcome: Progressing Towards Goal  Goal: Respiratory  Outcome: Progressing Towards Goal  Goal: *Vital signs within defined limits  Outcome: Progressing Towards Goal  Goal: *Labs within defined limits  Outcome: Progressing Towards Goal  Goal: *Hemodynamically stable  Outcome: Progressing Towards Goal

## 2022-11-04 NOTE — OP NOTES
Operative Note    Patient: Clarisse Nunez  YOB: 1995  MRN: [id number]    Date of Procedure:   22     Pre-Op Diagnosis:   1) IUP at 38w3d  2) nonreassuring fetal status, terminal bradycardia     Post-Op Diagnosis: same, funic cord presentation        * Surgery not found *    Surgical Assistant: primary low transverse      Anesthesia: * No surgery found *     Estimated Blood Loss (mL):  592KR    Complications: None    Specimens: * Cannot find log *     Implants: * No surgical log found *    Drains: * No LDAs found *    Findings: funic cord presentation, crying, vigorous infant delivered from vertex presentation, normal appering uterus and fallopian tubes    Detailed Description of Procedure: The patient was taken to the operating room, CSE was dosed by anesthesiology, and the patient was placed in the dorsal supine position with a left lateral tilt. She was prepped and draped in the usual sterile fashion. A Campos Blazing skin incision was made, bluntly stretching fascia. The peritoneum was accessed bluntly with our fingers. The lower uterine segment was identified, noted to be clear of any adhesive disease. The low transverse hysterotomy incision was made with a scalpel. The incision site was extended in a cranio-caudal direction using our fingers. The male infant was delivered using standard maneuvers. The cord was cut, and clamped, and the infant was handed off to the awaiting NIKOLAS attendants. The placenta was then expressed manually and intact. A clean, dry lap pad was then used to clear the intrauterine cavity of any clot or debris. The hysterotomy was then closed with 0-vicryl in a running, locked fashion, with second imbricating layer. A figure-of-eight stitch was placed midline along the hysterotomy. The uterine incision site was noted to be hemostatic.  The fascia was identified, examined and noted to be intact without defects, and closed with 0 Vicryl suture in a running manner. The skin was closed with 4-0 Monocryl in a running subcuticular manner. Sponge, lap, and instrument counts were correct x2. The patient was in good condition at the completion of the procedure and subsequently transferred to the recovery room.       Electronically Signed by Nathanael Carolina MD on 11/4/2022 at 12:22 PM

## 2022-11-04 NOTE — PROCEDURES
Non-Stress Test    Brief history, indication: contractions, false labor    Findings:  Baseline  bpm; moderate variability; greater than two accelerations to 150 bpm; no significant decelerations noted.     Result: Reactive NST    Nonstress test interpreted by myself      Amarilys Bush MD

## 2022-11-04 NOTE — PROGRESS NOTES
Called to re evaluate patient, she feels like she needs to push and leaking fluid now    SROM confirmed, appears to be thin meconium    Cervix 3/80/-2    Advised patient not to push, it is not time yet    Discussed epidural for pain management, Will admit

## 2022-11-04 NOTE — L&D DELIVERY NOTE
Delivery Summary  Patient: Timur January             Circumcision:   desires  Additional Delivery Comments - stat PLTCS for NRFHTs, see op report    Information for the patient's :  Ramiro Bryan [[id number]]     Delivery Type:     Delivery Date: 2022   Delivery Time: 11:51 AM     Birth Weight: 2.265 kg     Sex:  male  Delivery Clinician:      Gestational Age: 36w4d    Presentation:     Position:             Apgars were 8  and 9      Resuscitation Method: Suctioning-bulb; Tactile Stimulation     Meconium Stained:  Thin    Living Status: Living       Placenta Date/Time:     Placenta Removal:     Placenta Appearance:      Cord Information:      Cord Events:         Disposition of Cord Blood:      Blood Gases Sent?:        Cord pH:  none    Episiotomy:    Laceration(s):      Estimated Blood Loss (ml): No data found    Labor Events  Method:       Augmentation:    Cervical Ripening:             Operative Vaginal Delivery - none

## 2022-11-04 NOTE — H&P
OB History & Physical    Name: Lorie Nieves MRN: [id number]  SSN: [id number]    YOB: 1995  Age: 33 y.o. Sex: female      Subjective:     Chief complaint: contractions      History of Present Illness: Lorie Nieves is a 33 y.o.  female with an estimated gestational age of 36w4d with Estimated Date of Delivery: 11/15/22. Patient complains of contractions. No vaginal bleeding or leaking. Had intercourse yesterday and was seen in triage at that time with concerns of same- contractions. OB History          1    Para        Term                AB        Living             SAB        IAB        Ectopic        Molar        Multiple        Live Births                  Past Medical History:   Diagnosis Date    Genital herpes     Psychiatric problem      Past Surgical History:   Procedure Laterality Date    HX TONSIL AND ADENOIDECTOMY       Social History     Occupational History    Not on file   Tobacco Use    Smoking status: Never    Smokeless tobacco: Never   Vaping Use    Vaping Use: Never used   Substance and Sexual Activity    Alcohol use: No    Drug use: No    Sexual activity: Not on file     History reviewed. No pertinent family history. Allergies   Allergen Reactions    Pecan Nut Anaphylaxis    Codeine Nausea and Vomiting     Prior to Admission medications    Medication Sig Start Date End Date Taking? Authorizing Provider   valACYclovir (Valtrex) 500 mg tablet Take 500 mg by mouth daily. Provider, Historical   FLUoxetine (PROzac) 20 mg capsule Take  by mouth daily. Provider, Historical   PNV Comb #2-Iron-FA-Omega 3 29-1-400 mg cmpk Take  by mouth. Provider, Historical   PARoxetine (PAXIL) 10 mg tablet Take 20 mg by mouth daily. Patient not taking: Reported on 11/3/2022    Other, MD Kash   HYDROcodone-acetaminophen (NORCO) 5-325 mg per tablet Take 1 Tab by mouth every four (4) hours as needed for Pain. Max Daily Amount: 6 Tabs.   Patient not taking: Reported on 11/3/2022 1/15/18   [place] PastCHARITY Sena   omeprazole (PRILOSEC) 10 mg capsule Take 1 Cap by mouth daily. Patient not taking: Reported on 11/3/2022 1/15/18   CHARITY Woodard   ondansetron (ZOFRAN ODT) 4 mg disintegrating tablet Take 1 Tab by mouth every eight (8) hours as needed for Nausea. 1/15/18   Courtney Carver PA   cefdinir (OMNICEF) 300 mg capsule Take 600 mg by mouth daily. Patient not taking: Reported on 11/3/2022    Other, MD Kash   norgestimate-ethinyl estradiol (ORTHO-CYCLEN) 0.25-35 mg-mcg per tablet Take 1 Tab by mouth daily. Patient not taking: Reported on 11/3/2022    Jean Paul, MD Kash   amoxicillin-clavulanate (AUGMENTIN) 875-125 mg per tablet Take 1 Tab by mouth two (2) times a day. Patient not taking: Reported on 11/3/2022 5/15/13   Pinky Felty, MD        Review of Systems    Objective:     Vitals:    Vitals:    22 0519 22 0529   BP: 107/61    Pulse: 92    Resp: 18    Temp: 97.5 °F (36.4 °C)    SpO2: 100%    Weight:  88 kg (194 lb)   Height:  5' 2\" (1.575 m)      Temp (24hrs), Av.8 °F (36.6 °C), Min:97.5 °F (36.4 °C), Max:98 °F (36.7 °C)    BP  Min: 107/61  Max: 137/80     Physical Exam  General: in NAD  HEENT: normocephalic    Abdomen: Gravid, soft, nontender, no abnormal masses, rebound, rigidity, guarding  Fundus: soft, nontender  Pelvic:Cervical Exam: /-2  Uterine Activity: no contractions detected  Membranes: no gross evidence of ROM  Fetal Heart Rate: 130's adequate variability and reactivity; no significant abnormal decelerations    Labs: No results found for this or any previous visit (from the past 24 hour(s)). There is no problem list on file for this patient. US at bedside- vertex, normal appearing fluid, anterior placenta- normal appearance, no obvious abruption, placental lakes. Uterus intact. Assessment and Plan:       G1 @ 38wks 3d presents with contractions    IUP- category  1    2.  Contractions, false labor- no cervical change since yesterday, no contractions on tocometer or appreciated on palpations, but pt reporting significant pain. Will send CBC and fibrinogen, not tonically contracted, no bleeding, patient is writhing about the bed and difficult to assess. Discussed no cervical change, will give fluids, pain medication as requested and reassess.    Signed By:  Andre Phoenix MD     November 4, 2022

## 2022-11-04 NOTE — PROGRESS NOTES
05:15 am  Christine Jackson is a 33 y.o.   at 38w3d patient of Dr Maxx Barry at [address] [facility] who presents to L&D triage with c/o contractions. She reports Positive FM, denies vaginal bleeding. She also denies Headaches, edema, RUQ pain and no visual disturbances. Urine sample obtained. EFM and toco placed for initial assessment.

## 2022-11-04 NOTE — PROGRESS NOTES
36. Dr. Alejandra Retana at bedside, strip reviewed, SVE 2/70/-2,  orders for labs obtained, ultrasound performed confirmed vertex. 0710. Bedside shift change report given to SANTO Hurt RN (oncoming nurse) by VERONICA Sims RN (offgoing nurse). Report included the following information SBAR, Kardex, Intake/Output, MAR, and Recent Results.

## 2022-11-04 NOTE — PROGRESS NOTES
Labor Progress Note  Patient comfortable with epidural.    O:  Visit Vitals  BP (!) 141/78   Pulse 93   Temp 98.1 °F (36.7 °C)   Resp 20   Ht 5' 2\" (1.575 m)   Wt 88 kg (194 lb)   SpO2 100%   BMI 35.48 kg/m²     Gen: NAD  SVE: //-1    FHR: 130 / moderate variability / +accels / early decels, category I  [place]: difficult to trace with patient on her side, but Q2 minutes    A/P:  33 y.o.  at 38w3d here in labor. Epidural in place. GBS pos on PCN.  FHR reactive and reassuring.  - cEFM/toco  - anticipate   - continue to follow closely    Day Givens MD  2022  10:49 AM

## 2022-11-04 NOTE — ANESTHESIA PREPROCEDURE EVALUATION
Relevant Problems   No relevant active problems       Anesthetic History   No history of anesthetic complications            Review of Systems / Medical History  Patient summary reviewed, nursing notes reviewed and pertinent labs reviewed    Pulmonary  Within defined limits                 Neuro/Psych   Within defined limits           Cardiovascular  Within defined limits                Exercise tolerance: >4 METS     GI/Hepatic/Renal  Within defined limits              Endo/Other  Within defined limits           Other Findings   Comments: G1           Physical Exam    Airway  Mallampati: III  TM Distance: 4 - 6 cm  Neck ROM: normal range of motion   Mouth opening: Normal     Cardiovascular  Regular rate and rhythm,  S1 and S2 normal,  no murmur, click, rub, or gallop  Rhythm: regular  Rate: normal         Dental  No notable dental hx       Pulmonary  Breath sounds clear to auscultation               Abdominal  GI exam deferred       Other Findings            Anesthetic Plan    ASA: 2  Anesthesia type: CSE            Anesthetic plan and risks discussed with: Patient

## 2022-11-04 NOTE — PROGRESS NOTES
3433: Bedside and Verbal shift change report given to SANTO Hurt RN (oncoming nurse) by VERONICA Walker RN (offgoing nurse). Report included the following information SBAR and Kardex. 2287: MD at bedside for evaluation. 0830: Anesthesia at bedside at 0830. Patient positioned to side of the bed, EFM & TOCO adjusted to accommodate sterile field. Difficulty tracing due to maternal position. Time out completed at 0834. Successful epidural is completed by Dr Elian Barnhart . Patient is repositioned supine in bed with wedge under left hip. EFM and TOCO replaced and blood pressure frequency increased. Rocha catheter placed and epidural continuous infusion is started. 1045: MD at bedside for evaluation     1121:MD at bedside for evaluation     1126: Patient begins pushing. RN remains in continuous attendance at the bedside. Assessment & evaluation of fetal heart rate ongoing via continuous EFM. 1142: CS called by MD     [id number]: LTCS of live baby boy     26: TRANSFER - OUT REPORT:    Verbal report given to SANTO Sol RN(name) on Gustabo Stapleton  being transferred to MIU(unit) for routine progression of care       Report consisted of patients Situation, Background, Assessment and   Recommendations(SBAR). Information from the following report(s) SBAR and Kardex was reviewed with the receiving nurse. Lines:   Peripheral IV 11/04/22 Anterior;Right Wrist (Active)   Site Assessment Clean, dry, & intact 11/04/22 1318   Phlebitis Assessment 0 11/04/22 1318   Infiltration Assessment 0 11/04/22 1318   Dressing Status Clean, dry, & intact 11/04/22 0841   Dressing Type Tape;Transparent 11/04/22 1318   Hub Color/Line Status Pink; Infusing 11/04/22 1318        Opportunity for questions and clarification was provided.       Patient transported with:   Registered Nurse

## 2022-11-05 LAB
BASOPHILS # BLD: 0 K/UL (ref 0–0.1)
BASOPHILS NFR BLD: 0 % (ref 0–1)
DIFFERENTIAL METHOD BLD: ABNORMAL
EOSINOPHIL # BLD: 0 K/UL (ref 0–0.4)
EOSINOPHIL NFR BLD: 0 % (ref 0–7)
ERYTHROCYTE [DISTWIDTH] IN BLOOD BY AUTOMATED COUNT: 15.6 % (ref 11.5–14.5)
HCT VFR BLD AUTO: 28 % (ref 35–47)
HGB BLD-MCNC: 9.2 G/DL (ref 11.5–16)
IMM GRANULOCYTES # BLD AUTO: 0.1 K/UL (ref 0–0.04)
IMM GRANULOCYTES NFR BLD AUTO: 1 % (ref 0–0.5)
LYMPHOCYTES # BLD: 2.9 K/UL (ref 0.8–3.5)
LYMPHOCYTES NFR BLD: 13 % (ref 12–49)
MCH RBC QN AUTO: 30.5 PG (ref 26–34)
MCHC RBC AUTO-ENTMCNC: 32.9 G/DL (ref 30–36.5)
MCV RBC AUTO: 92.7 FL (ref 80–99)
MONOCYTES # BLD: 1.4 K/UL (ref 0–1)
MONOCYTES NFR BLD: 6 % (ref 5–13)
NEUTS SEG # BLD: 17.5 K/UL (ref 1.8–8)
NEUTS SEG NFR BLD: 80 % (ref 32–75)
NRBC # BLD: 0 K/UL (ref 0–0.01)
NRBC BLD-RTO: 0 PER 100 WBC
PLATELET # BLD AUTO: 311 K/UL (ref 150–400)
PMV BLD AUTO: 11.2 FL (ref 8.9–12.9)
RBC # BLD AUTO: 3.02 M/UL (ref 3.8–5.2)
WBC # BLD AUTO: 21.9 K/UL (ref 3.6–11)

## 2022-11-05 PROCEDURE — 65410000002 HC RM PRIVATE OB

## 2022-11-05 PROCEDURE — 86900 BLOOD TYPING SEROLOGIC ABO: CPT

## 2022-11-05 PROCEDURE — 74011250637 HC RX REV CODE- 250/637: Performed by: OBSTETRICS & GYNECOLOGY

## 2022-11-05 PROCEDURE — 85461 HEMOGLOBIN FETAL: CPT

## 2022-11-05 PROCEDURE — 74011250636 HC RX REV CODE- 250/636: Performed by: OBSTETRICS & GYNECOLOGY

## 2022-11-05 PROCEDURE — 36415 COLL VENOUS BLD VENIPUNCTURE: CPT

## 2022-11-05 PROCEDURE — 85025 COMPLETE CBC W/AUTO DIFF WBC: CPT

## 2022-11-05 RX ORDER — ONDANSETRON 4 MG/1
4 TABLET, ORALLY DISINTEGRATING ORAL
Status: DISCONTINUED | OUTPATIENT
Start: 2022-11-05 | End: 2022-11-06 | Stop reason: HOSPADM

## 2022-11-05 RX ADMIN — VALACYCLOVIR HYDROCHLORIDE 500 MG: 500 TABLET, FILM COATED ORAL at 20:56

## 2022-11-05 RX ADMIN — IBUPROFEN 800 MG: 400 TABLET, FILM COATED ORAL at 20:56

## 2022-11-05 RX ADMIN — ONDANSETRON 4 MG: 4 TABLET, ORALLY DISINTEGRATING ORAL at 03:04

## 2022-11-05 RX ADMIN — FLUOXETINE 20 MG: 20 CAPSULE ORAL at 20:56

## 2022-11-05 RX ADMIN — IBUPROFEN 800 MG: 400 TABLET, FILM COATED ORAL at 03:05

## 2022-11-05 RX ADMIN — VALACYCLOVIR HYDROCHLORIDE 500 MG: 500 TABLET, FILM COATED ORAL at 08:40

## 2022-11-05 RX ADMIN — IBUPROFEN 800 MG: 400 TABLET, FILM COATED ORAL at 12:04

## 2022-11-05 NOTE — PROGRESS NOTES
Problem: Vaginal Delivery: Day of Delivery-Post delivery  Goal: Activity/Safety  Outcome: Progressing Towards Goal  Goal: Nutrition/Diet  Outcome: Progressing Towards Goal  Goal: Medications  Outcome: Progressing Towards Goal  Goal: Treatments/Interventions/Procedures  Outcome: Progressing Towards Goal  Goal: *Vital signs within defined limits  Outcome: Progressing Towards Goal  Goal: *Labs within defined limits  Outcome: Progressing Towards Goal  Goal: *Hemodynamically stable  Outcome: Progressing Towards Goal  Goal: *Optimal pain control at patient's stated goal  Outcome: Progressing Towards Goal  Goal: *Participates in infant care  Outcome: Progressing Towards Goal  Goal: *Demonstrates progressive activity  Outcome: Progressing Towards Goal  Goal: *Tolerating diet  Outcome: Progressing Towards Goal

## 2022-11-05 NOTE — PROGRESS NOTES
Bedside shift change report given to VERONICA Li RN (oncoming nurse) by SANTO Humphrey RN (offgoing nurse). Report included the following information SBAR and Kardex. 0244 Pt called to request nausea medicine. TC to Dr. Adrianne Rogel. Orders received. Bedside shift change report given to SHAINA Salazar RN (oncoming nurse) by VERONICA Li RN (offgoing nurse). Report included the following information SBAR and Kardex.

## 2022-11-05 NOTE — LACTATION NOTE
This note was copied from a baby's chart. 11/05/22 1500   Visit Information   Lactation Consult Visit Type IP Consult Follow Up   Visit Length 15 minutes   Referral Received From Lactation Consultant Follow-up   Reason for Visit Education   Breast- Feeding Assessment   Breast-Feeding Experience No  Equipment: Has a Medela breast pump; Measured for and supplied with 21mm flanges     Lactation Consultant Visits   Breast-Feedings Good      Discussed with mother pumping after breastfeeding and offering EBM. Mother would prefer to use a hand pump. Provided her with a hand pump and demonstrated how to use. Reviewed the feeding plan and addressed mother's questions. Plan:  Offer lots of skin to skin and access to the breast.  Feed baby at early signs of hunger every 2-3 hours. Assure a deep latch, check that baby's lips are turned outward and use breast compression to keep baby actively feeding. Pump and hand express after breastfeeding; Feed baby EBM. Monitor wet and dirty diapers for signs of adequate intake. Follow instructions for managing engorgement.

## 2022-11-05 NOTE — PROGRESS NOTES
Duramorph Follow-Up Note    1 Day Post-Op sp Procedure(s):   SECTION. /66   Pulse 91   Temp 37.1 °C (98.7 °F)   Resp 16   Ht 5' 2\" (1.575 m)   Wt 88 kg (194 lb)   SpO2 98%   Breastfeeding Unknown   BMI 35.48 kg/m² . Patient is POD-1 S/P intrathecal duramorph. Pain is well controlled  Patient reports no headache, fever, weakness or numbness. Epidural/spinal tap site is clean, dry and intact. No obvious Anesthesia complications noted. Plan:    Pain management as per primary service.

## 2022-11-05 NOTE — ANESTHESIA POSTPROCEDURE EVALUATION
* No procedures listed *. CSE    Anesthesia Post Evaluation      Multimodal analgesia: multimodal analgesia used between 6 hours prior to anesthesia start to PACU discharge  Patient location during evaluation: PACU  Level of consciousness: sleepy but conscious  Pain management: adequate  Airway patency: patent  Anesthetic complications: no  Cardiovascular status: acceptable  Respiratory status: acceptable  Hydration status: acceptable  Comments: +Post-Anesthesia Evaluation and Assessment    Patient: Edis Stiles MRN: [id number]  SSN: [id number]   YOB: 1995  Age: 33 y.o. Sex: female      Cardiovascular Function/Vital Signs    /76   Pulse 95   Temp 36.7 °C (98 °F)   Resp 18   Ht 5' 2\" (1.575 m)   Wt 88 kg (194 lb)   SpO2 98%   Breastfeeding Unknown   BMI 35.48 kg/m²     Patient is status post * No procedures listed *. Nausea/Vomiting: Controlled. Postoperative hydration reviewed and adequate. Pain:  Pain Scale 1: Numeric (0 - 10) (11/04/22 2345)  Pain Intensity 1: 0 (11/04/22 2345)   Managed. Neurological Status:   Neuro (WDL): Within Defined Limits (11/04/22 0841)   At baseline. Mental Status and Level of Consciousness: Arousable. Pulmonary Status:   O2 Device: None (Room air) (11/04/22 2345)   Adequate oxygenation and airway patent. Complications related to anesthesia: None    Post-anesthesia assessment completed. No concerns. Signed By: Irvin Palencia MD    11/5/2022  Post anesthesia nausea and vomiting:  controlled  Final Post Anesthesia Temperature Assessment:  Normothermia (36.0-37.5 degrees C)      INITIAL Post-op Vital signs: No vitals data found for the desired time range.

## 2022-11-05 NOTE — PROGRESS NOTES
Bedside and Verbal shift change report given to VERONICA Gonzalez RN (oncoming nurse) by SANTO Canela RN (offgoing nurse). Report included the following information SBAR, Kardex, Intake/Output, MAR, Accordion, and Recent Results.

## 2022-11-05 NOTE — PROGRESS NOTES
Post-Operative  Day 1    [place] Wills     Assessment: Post-Op day 1, stable s.p stat pLTCS for NRFHTs, terminal bradycardia. Hgb 11.6 > 9.2. Plan:     - Routine post-operative care. - The risks and benefits of the circumcision  procedure and anesthesia including: bleeding, infection, variability of cosmetic results were discussed at length with the mother. She is aware that future repeat procedures may be necessary. She gives informed consent to proceed as noted and her questions are answered. - Postop hemoglobin stable. Plan to start Fe at discharge if pt anemic.  - Ambulate today. Information for the patient's :  Jacquie Ferrell [[id number]]   , Low Transverse   Patient doing well without significant complaint. Tolerating diet. Rocha out. Ambulating. Vitals:  Visit Vitals  /66   Pulse 91   Temp 98.7 °F (37.1 °C)   Resp 16   Ht 5' 2\" (1.575 m)   Wt 88 kg (194 lb)   SpO2 98%   Breastfeeding Unknown   BMI 35.48 kg/m²     Temp (24hrs), Av.3 °F (36.8 °C), Min:97.8 °F (36.6 °C), Max:98.7 °F (37.1 °C)      Last 24hr Input/Output:    Intake/Output Summary (Last 24 hours) at 2022 1011  Last data filed at 2022 0734  Gross per 24 hour   Intake 1050 ml   Output 2950 ml   Net -1900 ml          Exam:     Patient without distress. Fundus firm, nontender per nursing fundal checks. Incision bandaged, clean, dry, intact. Perineum with normal lochia noted per nursing assessment. Lower extremities are negative for pathological edema.     Labs:   Lab Results   Component Value Date/Time    WBC 21.9 (H) 2022 03:11 AM    WBC 16.4 (H) 2022 06:10 AM    WBC 8.7 01/15/2018 05:57 PM    WBC 10.8 (H) 05/15/2013 03:40 PM    HGB 9.2 (L) 2022 03:11 AM    HGB 11.6 2022 06:10 AM    HGB 13.4 01/15/2018 05:57 PM    HGB 13.1 05/15/2013 03:40 PM    HCT 28.0 (L) 2022 03:11 AM    HCT 34.9 (L) 2022 06:10 AM    HCT 39.6 01/15/2018 05:57 PM    HCT 38.8 05/15/2013 03:40 PM    PLATELET 594 36/83/3982 03:11 AM    PLATELET 470 40/02/2402 06:10 AM    PLATELET 536 16/50/3279 05:57 PM    PLATELET 347 84/43/4831 03:40 PM       Recent Results (from the past 24 hour(s))   CBC WITH AUTOMATED DIFF    Collection Time: 11/05/22  3:11 AM   Result Value Ref Range    WBC 21.9 (H) 3.6 - 11.0 K/uL    RBC 3.02 (L) 3.80 - 5.20 M/uL    HGB 9.2 (L) 11.5 - 16.0 g/dL    HCT 28.0 (L) 35.0 - 47.0 %    MCV 92.7 80.0 - 99.0 FL    MCH 30.5 26.0 - 34.0 PG    MCHC 32.9 30.0 - 36.5 g/dL    RDW 15.6 (H) 11.5 - 14.5 %    PLATELET 236 377 - 272 K/uL    MPV 11.2 8.9 - 12.9 FL    NRBC 0.0 0  WBC    ABSOLUTE NRBC 0.00 0.00 - 0.01 K/uL    NEUTROPHILS 80 (H) 32 - 75 %    LYMPHOCYTES 13 12 - 49 %    MONOCYTES 6 5 - 13 %    EOSINOPHILS 0 0 - 7 %    BASOPHILS 0 0 - 1 %    IMMATURE GRANULOCYTES 1 (H) 0.0 - 0.5 %    ABS. NEUTROPHILS 17.5 (H) 1.8 - 8.0 K/UL    ABS. LYMPHOCYTES 2.9 0.8 - 3.5 K/UL    ABS. MONOCYTES 1.4 (H) 0.0 - 1.0 K/UL    ABS. EOSINOPHILS 0.0 0.0 - 0.4 K/UL    ABS. BASOPHILS 0.0 0.0 - 0.1 K/UL    ABS. IMM.  GRANS. 0.1 (H) 0.00 - 0.04 K/UL    DF AUTOMATED

## 2022-11-05 NOTE — LACTATION NOTE
This note was copied from a baby's chart. 22 1200   Visit Information   Lactation Consult Visit Type IP Initial Consult   Visit Length 30 minutes   Reason for Visit Normal Merino Visit;Education   Breast- Feeding Assessment   Breast-Feeding Experience No  Equipment: Has a Medela and a hands free breast pump; Measured for 18-20mm flanges; Supplied with available 21mm flanges   Breast Assessment   Left Breast Medium  (Colostrum expressed)   Left Nipple Everted   Right Breast Medium   Right Nipple Everted   Mother/Infant Observation   Mother Observation Breast comfortable;Close hold;Cramps; Recognizes feeding cues   Infant Observation Breast tissue moves; Feeding cues; Frenulum checked; Latches nipple and aereolae;Lips flanged, lower; Lips flanged, upper;Opens mouth  (Oral assessment WDL)   LATCH Documentation   Latch 2   Audible Swallowing 1   Type of Nipple 2   Comfort (Breast/Nipple) 2   Hold (Positioning) 1   LATCH Score 8     Reviewed the \"Your Guide to Breastfeeding\" booklet. Discussed the typical feeding characteristics in the 1st and 2nd DOL and signs of adequate intake. Demonstrated the asymmetric latch and observed baby showing good signs of transfer on the breast. Discussed a feeding plan and mother's questions were addressed. Plan:  Offer lots of skin to skin and access to the breast.  Feed baby at early signs of hunger every 2-3 hours. Assure a deep latch, check that baby's lips are turned outward and use breast compression to keep baby actively feeding. Pump/hand express for poor feeds and offer baby EBM. Monitor wet and dirty diapers for signs of adequate intake. Follow instructions for managing engorgement.

## 2022-11-06 VITALS
HEART RATE: 78 BPM | DIASTOLIC BLOOD PRESSURE: 73 MMHG | RESPIRATION RATE: 18 BRPM | HEIGHT: 62 IN | OXYGEN SATURATION: 99 % | BODY MASS INDEX: 35.7 KG/M2 | TEMPERATURE: 98.3 F | SYSTOLIC BLOOD PRESSURE: 128 MMHG | WEIGHT: 194 LBS

## 2022-11-06 PROCEDURE — 74011250637 HC RX REV CODE- 250/637: Performed by: OBSTETRICS & GYNECOLOGY

## 2022-11-06 PROCEDURE — 74011250636 HC RX REV CODE- 250/636: Performed by: OBSTETRICS & GYNECOLOGY

## 2022-11-06 RX ORDER — OXYCODONE HYDROCHLORIDE 5 MG/1
5 TABLET ORAL
Qty: 15 TABLET | Refills: 0 | Status: SHIPPED | OUTPATIENT
Start: 2022-11-06 | End: 2022-11-11

## 2022-11-06 RX ORDER — DOCUSATE SODIUM 100 MG/1
100 CAPSULE, LIQUID FILLED ORAL 2 TIMES DAILY
Qty: 60 CAPSULE | Refills: 2 | Status: SHIPPED | OUTPATIENT
Start: 2022-11-06 | End: 2023-02-04

## 2022-11-06 RX ORDER — ACETAMINOPHEN 500 MG
1000 TABLET ORAL
Qty: 60 TABLET | Refills: 0 | Status: SHIPPED | OUTPATIENT
Start: 2022-11-06

## 2022-11-06 RX ORDER — IBUPROFEN 800 MG/1
800 TABLET ORAL
Qty: 60 TABLET | Refills: 0 | Status: SHIPPED | OUTPATIENT
Start: 2022-11-06

## 2022-11-06 RX ADMIN — IBUPROFEN 800 MG: 400 TABLET, FILM COATED ORAL at 06:23

## 2022-11-06 RX ADMIN — OXYCODONE 5 MG: 5 TABLET ORAL at 13:12

## 2022-11-06 RX ADMIN — HUMAN RHO(D) IMMUNE GLOBULIN 0.3 MG: 300 INJECTION, SOLUTION INTRAMUSCULAR at 08:45

## 2022-11-06 RX ADMIN — ONDANSETRON 4 MG: 4 TABLET, ORALLY DISINTEGRATING ORAL at 05:27

## 2022-11-06 RX ADMIN — VALACYCLOVIR HYDROCHLORIDE 500 MG: 500 TABLET, FILM COATED ORAL at 07:51

## 2022-11-06 RX ADMIN — OXYCODONE 5 MG: 5 TABLET ORAL at 05:03

## 2022-11-06 NOTE — PROGRESS NOTES
Post-Operative  Day 2    [place] Wills       Assessment: Post-Op day 2, doing well    Plan:   - Discharge home today. - Follow up in office in 6 week(s) with [facility].  - Pain medication prescription(s) sent. - Questions answered. Information for the patient's :  Harleen Gould [[id number]]   , Low Transverse  Patient doing well without significant complaint. Tolerating regular diet. Ambulating. Voiding without difficulty. Vitals:  Visit Vitals  /73   Pulse 78   Temp 98.3 °F (36.8 °C)   Resp 18   Ht 5' 2\" (1.575 m)   Wt 88 kg (194 lb)   SpO2 99%   Breastfeeding Unknown   BMI 35.48 kg/m²     Temp (24hrs), Av.6 °F (37 °C), Min:98.3 °F (36.8 °C), Max:98.8 °F (37.1 °C)        Exam:       Patient without distress. Fundus firm, nontender per nursing fundal checks. Bandage removed. Clean, dry, intact. Perineum with normal lochia noted per nursing assessment. Lower extremities are negative for pathological edema. Labs:   Lab Results   Component Value Date/Time    WBC 21.9 (H) 2022 03:11 AM    WBC 16.4 (H) 2022 06:10 AM    WBC 8.7 01/15/2018 05:57 PM    WBC 10.8 (H) 05/15/2013 03:40 PM    HGB 9.2 (L) 2022 03:11 AM    HGB 11.6 2022 06:10 AM    HGB 13.4 01/15/2018 05:57 PM    HGB 13.1 05/15/2013 03:40 PM    HCT 28.0 (L) 2022 03:11 AM    HCT 34.9 (L) 2022 06:10 AM    HCT 39.6 01/15/2018 05:57 PM    HCT 38.8 05/15/2013 03:40 PM    PLATELET 822  03:11 AM    PLATELET 687  06:10 AM    PLATELET 720  05:57 PM    PLATELET 696  03:40 PM       No results found for this or any previous visit (from the past 24 hour(s)).

## 2022-11-06 NOTE — PROGRESS NOTES
Bedside shift change report given to TAWANDA Bustamante RN (oncoming nurse) by SHAINA Booker RN (offgoing nurse). Report included the following information SBAR, Kardex, and MAR.     0350: RN found  in patient bed without parent present. Patient educated on importance of safe sleep. Advised to place  in crib once feeds are complete. Patient understood and taught back. 0715: Bedside shift change report given to SHAINA Salazar RN (oncoming nurse) by TAWANDA Bustamante RN (offgoing nurse). Report included the following information SBAR, Kardex, and MAR.

## 2022-11-06 NOTE — DISCHARGE SUMMARY
Obstetrical Discharge Summary     Name: Tonny Low MRN: [id number]  SSN: [id number]    YOB: 1995  Age: 33 y.o. Sex: female      Admit Date: 2022    Discharge Date: 2022     Admitting Physician: Nolan López MD     Attending Physician:  Olive Hodgkin*     Admission Diagnoses: PROM (premature rupture of membranes) [O42.90]    Discharge Diagnoses:   Information for the patient's :  Drusilla Ormond [[id number]]   Delivery of a 2.265 kg male infant via , Low Transverse on 2022 at 11:51 AM  by Bello Montoya. Apgars were 8  and 9 . Additional Diagnoses:   Hospital Problems  Never Reviewed            Codes Class Noted POA    PROM (premature rupture of membranes) ICD-10-CM: O42.90  ICD-9-CM: 658.10  2022 Unknown          Lab Results   Component Value Date/Time    Rubella, External immune 04/15/2022 12:00 AM       Hospital Course: Normal hospital course following the delivery. Patient Instructions:   Current Discharge Medication List        START taking these medications    Details   ibuprofen (MOTRIN) 800 mg tablet Take 1 Tablet by mouth every eight (8) hours as needed for Pain. Qty: 60 Tablet, Refills: 0      oxyCODONE IR (ROXICODONE) 5 mg immediate release tablet Take 1 Tablet by mouth every six (6) hours as needed for Pain for up to 5 days. Max Daily Amount: 20 mg.  Qty: 15 Tablet, Refills: 0    Associated Diagnoses: S/P       docusate sodium (Colace) 100 mg capsule Take 1 Capsule by mouth two (2) times a day for 90 days. Qty: 60 Capsule, Refills: 2      acetaminophen (Acetaminophen Extra Strength) 500 mg tablet Take 2 Tablets by mouth every eight (8) hours as needed for Pain. Qty: 60 Tablet, Refills: 0           CONTINUE these medications which have NOT CHANGED    Details   FLUoxetine (PROzac) 20 mg capsule Take  by mouth daily. PNV Comb #2-Iron-FA-Omega 3 29-1-400 mg cmpk Take  by mouth.            STOP taking these medications       valACYclovir (VALTREX) 500 mg tablet Comments:   Reason for Stopping:         ondansetron (ZOFRAN ODT) 4 mg disintegrating tablet Comments:   Reason for Stopping:         PARoxetine (PAXIL) 10 mg tablet Comments:   Reason for Stopping:         HYDROcodone-acetaminophen (NORCO) 5-325 mg per tablet Comments:   Reason for Stopping:         omeprazole (PRILOSEC) 10 mg capsule Comments:   Reason for Stopping:               Disposition at Discharge: Home or self care    Condition at Discharge: Stable    Reference my discharge instructions. Follow-up Appointments   Procedures    FOLLOW UP VISIT Appointment in: Two Weeks Mood check     Mood check     Standing Status:   Standing     Number of Occurrences:   1     Order Specific Question:   Appointment in     Answer:    Two Weeks        Signed By:  Socrates Mishra MD     November 6, 2022

## 2022-11-06 NOTE — DISCHARGE INSTRUCTIONS
After Your Delivery (the Postpartum Period): Care Instructions  Overview     Congratulations on the birth of your baby. Like pregnancy, the  period can be a time of excitement, pilar, and exhaustion. You may look at your wondrous little baby and feel happy. You may also be overwhelmed by your new sleep hours and new responsibilities. At first, babies often sleep during the days and are awake at night. They do not have a pattern or routine. They may make sudden gasps, jerk themselves awake, or look like they have crossed eyes. These are all normal, and they may even make you smile. In these first weeks after delivery, try to take good care of yourself. It may take 4 to 6 weeks to feel like yourself again, and possibly longer if you had a  birth. You will likely feel very tired for several weeks. Your days will be full of ups and downs, but lots of pilar as well. Follow-up care is a key part of your treatment and safety. Be sure to make and go to all appointments, and call your doctor if you are having problems. It's also a good idea to know your test results and keep a list of the medicines you take. How can you care for yourself at home? Take care of your body after delivery  Use pads instead of tampons for the bloody flow that may last as long as 2 weeks. Ease cramps with ibuprofen (Advil, Motrin). Ease soreness of hemorrhoids and the area between your vagina and rectum with ice compresses or witch hazel pads. Ease constipation by drinking lots of fluid and eating high-fiber foods. Ask your doctor about over-the-counter stool softeners. Cleanse yourself with a gentle squeeze of warm water from a bottle instead of wiping with toilet paper. Take a sitz bath in warm water several times a day. Wear a good nursing bra. Ease sore and swollen breasts with warm, wet washcloths. If you aren't breastfeeding, use ice rather than heat for breast soreness.   Your period may not start for several months if you are breastfeeding. You may bleed more, and longer at first, than you did before you got pregnant. Wait until you are healed (about 4 to 6 weeks) before you have sex. Ask your doctor when it is okay for you to have sex. Try not to travel with your baby for 5 or 6 weeks. If you take a long car trip, make frequent stops to walk around and stretch. Avoid exhaustion  Rest every day. Try to nap when your baby naps. Ask another adult to be with you for a few days after delivery. Plan for  if you have other children. Stay flexible so you can eat at odd hours and sleep when you need to. Both you and your baby are making new schedules. Plan small trips to get out of the house. Change can make you feel less tired. Ask for help with housework, cooking, and shopping. Remind yourself that your job is to care for your baby. Know about help for postpartum depression  \"Baby blues\" are common for the first 1 to 2 weeks after birth. You may cry or feel sad or irritable for no reason. Rest whenever you can. Being tired makes it harder to handle your emotions. Go for walks with your baby. Talk to your partner, friends, and family about your feelings. If your symptoms last for more than a few weeks, or if you feel very depressed, ask your doctor for help. Postpartum depression can be treated. Support groups and counseling can help. Sometimes medicine can also help. Stay healthy  Eat healthy foods so you have more energy. If you breastfeed, avoid drugs. If you quit smoking during pregnancy, try to stay smoke-free. If you choose to have a drink now and then, have only one drink, and limit the number of occasions that you have a drink. Wait to breastfeed at least 2 hours after you have a drink to reduce the amount of alcohol the baby may get in the milk. Start daily exercise after 4 to 6 weeks, but rest when you feel tired. Learn exercises to tone your belly.  Try Kegel exercises to regain strength in your pelvic muscles. You can do these exercises while you stand or sit. (If doing these exercises causes pain, stop doing them and talk with your doctor.)  Squeeze your muscles as if you were trying not to pass gas. Or squeeze your muscles as if you were stopping the flow of urine. Your belly, legs, and buttocks shouldn't move. Hold the squeeze for 3 seconds, then relax for 5 to 10 seconds. Start with 3 seconds, then add 1 second each week until you are able to squeeze for 10 seconds. Repeat the exercise 10 times a session. Do 3 to 8 sessions a day. Find a class for you and your baby that has an exercise time. If you had a  birth, give yourself a bit more time before you exercise, and be careful. When should you call for help? Share this information with your partner, family, or a friend. They can help you watch for warning signs. Call 911  anytime you think you may need emergency care. For example, call if:    You have thoughts of harming yourself, your baby, or another person. You passed out (lost consciousness). You have chest pain, are short of breath, or cough up blood. You have a seizure. Call your doctor now or seek immediate medical care if:    You have signs of hemorrhage (too much bleeding), such as:  Heavy vaginal bleeding. This means that you are soaking through one or more pads in an hour. Or you pass blood clots bigger than an egg. Feeling dizzy or lightheaded, or you feel like you may faint. Feeling so tired or weak that you cannot do your usual activities. A fast or irregular heartbeat. New or worse belly pain. You have signs of infection, such as:  A fever. Vaginal discharge that smells bad. New or worse belly pain. You have symptoms of a blood clot in your leg (called a deep vein thrombosis), such as:  Pain in the calf, back of the knee, thigh, or groin. Redness and swelling in your leg or groin.      You have signs of preeclampsia, such as:  Sudden swelling of your face, hands, or feet. New vision problems (such as dimness, blurring, or seeing spots). A severe headache. Watch closely for changes in your health, and be sure to contact your doctor if:    Your vaginal bleeding isn't decreasing. You feel sad, anxious, or hopeless for more than a few days. You are having problems with your breasts or breastfeeding. Where can you learn more? Go to http://www.[employer].com/  Enter A461 in the search box to learn more about \"After Your Delivery (the Postpartum Period): Care Instructions. \"  Current as of: February 23, 2022               Content Version: 13.4  © 2494-2784 [employer]. Care instructions adapted under license by [employer] (which disclaims liability or warranty for this information). If you have questions about a medical condition or this instruction, always ask your healthcare professional. [place] [address] any warranty or liability for your use of this information.

## 2022-11-07 LAB
ABO + RH BLD: NORMAL
BLD PROD TYP BPU: NORMAL
BPU ID: NORMAL
FETAL SCREEN,FMHS: NORMAL
STATUS OF UNIT,%ST: NORMAL
UNIT DIVISION, %UDIV: 0

## 2022-11-15 NOTE — ADT AUTH CERT NOTES
[facility]           [place] DDVTECH [id number] [phone]       Patient: Lucie Nickerson  MRN: [id number]  :1995      Dale Flores     MRN: [id number]     Link to Baby  Comment        [de-identified] name MRN Account  Age Sex Admission Type    Dominique Rothman [id number] 22 11 days M Confirmed Discharge     OB History       1    Para   1    Term   1            AB        Living   1      SAB        IAB        Ectopic        Molar        Multiple   0    Live Births   1         # Outcome Date GA Labor/2nd Weight Sex Delivery Anes PTL Lv A1 A5   1 Term 22 38w3d / 0h 30m 2.265 kg M CS-LTranv CSE N Living 8 9   Name: Haylee Guerra   Complications: Fetal Intolerance   Location: Other   Delivering Clinician: Patria Wilkins MD        Prenatal History    Flowsheet Row Most Recent Value   Did You Receive Prenatal Care Yes   Name Of OB Provider Dr. [facility]str[facility] (Maternal Fetal Medicine)? No   Fetal Ultrasound Abnormalities/Concerns?  No   Infant Feeding Breast Milk   Circumcision Planned Yes   Pediatrician After Birth/ Follow Up Baby Visits undecided     Dating Summary    Working WILLIAN: 11/15/22 set by David Nieves RN on 22 based on Other Basis     Based On WILLIAN GA Diff GA User Date   Other Basis 11/15/22 Working  Bettye Sanchez RN 22     Prenatal Results    Prenatal Labs    Test Value Date Time   ABO/Rh * A Negative  04/15/22    Antibody Scrn * negative  04/15/22    Hgb      Hct      Platelets      Rubella * immune  04/15/22    RPR      T. Pallidum Antibody * non reactive  04/15/22    Urine      Hep B Surf Ag * negative  04/15/22    Hep C * negative  04/15/22    HIV * non reactive  04/15/22    Gonorrhea * negative  04/15/22    Chlamydia * negative  04/15/22    TSH      GTT, 1 HR (Glucola)      GTT, Fasting      GTT, 1 HR      GTT, 2 HR      GTT, 3 HR        3rd Trimester    Test Value Date Time   Hgb      Hct      Platelets      Group B Strep      Antibody Scrn      TSH      T. Pallidum Antibody      Hep B Surf Ag      Gonorrhea      Chlamydia       Screening/Diagnostics    Test Value Date Time   AFP Only      AFP Tetra      Hgb Electrophoresis      Amniocentesis      Cystic Fibrosis      Thalessemia      Alfredo-Sachs      Canavan      PAP Smear      Beta HCG      NT      NIPT      COVID-19        Lab    Test Value Date Time   GTT, Fasting      GTT, 1HR      GTT, 2HR      GTT, 3HR      RPR      Beta HCG      CMV Ab      Toxoplasma Ab      Varicella Zoster Ab         Legend    *: Historical  View all results for this pregnancy     Gwyndolyn Hammans [[id number]]   Delivery    Birth date/time: 2022 11:51:00  Delivery type: , Low Transverse   categorization: Primary   priority: Emergency  Indications for : Fetal Intolerance of Labor  Complications: Fetal Intolerance  Labor Event Times    Dilation complete date/time: 2022 1121  Start pushing date/time: 2022 112  Decision date/time (emergent ): 2022 1142  Labor Events     labor?: No   steroids?: None  Antibiotics during labor?: Yes  Rupture date/time: 2022 0755  Rupture type: SROM  Trial of labor?: Yes  Cervical ripening: None  Induction: None  Augmentation: None  Complications: Fetal Intolerance  Delivery Providers    Delivering clinician: Padmini Braun MD  Provider Role   Padmini Braun MD Obstetrician   Tylor Brooks RN Primary Nurse   Kerri Apgar, RN Primary  Nurse   Ivonne Kim, RN NICU Nurse   Elton Clifford MD Neonatologist   Mary Almonte MD Anesthesiologist   Irwin Trejo, RN Charge Nurse   Koffi Mauro, RN Staff Nurse   Pio Mohamud RN Staff Nurse   Leta Peng, RN Charge Nurse   Darron Montes De Oca MD Surgeon Assistant     Anesthesia    Method: CSE  Multiple Birth Onset Second Stage    Second Stage Start Date: 22 Second Stage Start Time: 1121     Operative Delivery    Forceps attempted?: No  Vacuum extractor attempted?: No  Presentation    Presentation: Vertex  Apgars    Living status: Living  Apgars:  1 min. :  5 min.:  10 min. :  15 min.:  20 min.:    Skin color:  0  1       Heart rate:  2  2       Reflex irritability:  2  2       Muscle tone:  2  2       Respiratory effort:  2  2       Total:  8  9       Apgars assigned by: DR. Macie Posey  Resuscitation    Method: Suctioning-bulb, Tactile Stimulation  Shoulder Dystocia    Shoulder dystocia present?: No  Measurements    Weight: 2265 g  Weight (lbs): 4 lb 15.9 oz  Length: 45.7 cm  Length (in): 18\"  Head circumference: 31.5 cm  Chest circumference: 30 cm  Abdominal girth: 29 cm  Lacerations    Episiotomy: None    Lacerations: None  Repair Needed: None  # of Repair Packets:   Suture Type and Size:   Suture Comment:   Estimated Blood Loss (mL):       Placenta    Placenta Date/Time: 2022 1152  Removal: Manual Removal  Appearance: Intact Placenta disposition: Discarded     Vaginal Counts    Delivery Summary History    Event User Date/Time   Signed Gerilyn Cushing, RN 2022 16:38:49       Patient Demographics    Patient Name   Marv Miguel   [id number] Legal Sex   Female    1995 Address   [address] [place] [address] Phone   [phone] (Home)   [phone] (Mobile)     H&P Notes     H&P by Chuyita Patel MD at 22 documented on Admission (Discharged) from 2022 in [facility]    Author: Chuyita Patel MD Author Type: Physician Filed: 22   Note Status: Addendum Eddye Dienes: Cosign Not Required Date of Service: 22   : Chuiyta Patel MD (Physician)       Prior Versions: 1. Chuyita Patel MD (Physician) at 22 - Signed   Expand All Collapse All  OB History & Physical     Name: Lorie Nieves MRN: [id number]  SSN: [id number]    YOB: 1995  Age: 33 y.o.   Sex: female       Subjective:      Chief complaint: contractions        History of Present Illness: Edis Stiles is a 33 y.o.  female with an estimated gestational age of 36w4d with Estimated Date of Delivery: 11/15/22. Patient complains of contractions. No vaginal bleeding or leaking. Had intercourse yesterday and was seen in triage at that time with concerns of same- contractions. OB History            1    Para        Term                AB        Living               SAB        IAB        Ectopic        Molar        Multiple        Live Births                         Past Medical History:   Diagnosis Date    Genital herpes      Psychiatric problem              Past Surgical History:   Procedure Laterality Date    HX TONSIL AND ADENOIDECTOMY          Social History           Occupational History    Not on file   Tobacco Use    Smoking status: Never    Smokeless tobacco: Never   Vaping Use    Vaping Use: Never used   Substance and Sexual Activity    Alcohol use: No    Drug use: No    Sexual activity: Not on file      History reviewed. No pertinent family history. Allergies   Allergen Reactions    Pecan Nut Anaphylaxis    Codeine Nausea and Vomiting              Prior to Admission medications    Medication Sig Start Date End Date Taking? Authorizing Provider   valACYclovir (Valtrex) 500 mg tablet Take 500 mg by mouth daily. Provider, Historical   FLUoxetine (PROzac) 20 mg capsule Take  by mouth daily. Provider, Historical   PNV Comb #2-Iron-FA-Omega 3 29-1-400 mg cmpk Take  by mouth. Provider, Historical   PARoxetine (PAXIL) 10 mg tablet Take 20 mg by mouth daily. Patient not taking: Reported on 11/3/2022       Other, MD Kash   HYDROcodone-acetaminophen (NORCO) 5-325 mg per tablet Take 1 Tab by mouth every four (4) hours as needed for Pain. Max Daily Amount: 6 Tabs.   Patient not taking: Reported on 11/3/2022 1/15/18     CHARITY Sanchez   omeprazole (PRILOSEC) 10 mg capsule Take 1 Cap by mouth daily. Patient not taking: Reported on 11/3/2022 1/15/18     CHARITY Nielsen   ondansetron (ZOFRAN ODT) 4 mg disintegrating tablet Take 1 Tab by mouth every eight (8) hours as needed for Nausea. 1/15/18     Kayla Carver PA   cefdinir (OMNICEF) 300 mg capsule Take 600 mg by mouth daily. Patient not taking: Reported on 11/3/2022       Other, MD Kash   norgestimate-ethinyl estradiol (ORTHO-CYCLEN) 0.25-35 mg-mcg per tablet Take 1 Tab by mouth daily. Patient not taking: Reported on 11/3/2022       Other, MD Kash   amoxicillin-clavulanate (AUGMENTIN) 875-125 mg per tablet Take 1 Tab by mouth two (2) times a day. Patient not taking: Reported on 11/3/2022 5/15/13     Omar Zuleta MD         Review of Systems     Objective:      Vitals:         Vitals:     22 0519 22 0529   BP: 107/61     Pulse: 92     Resp: 18     Temp: 97.5 °F (36.4 °C)     SpO2: 100%     Weight:   88 kg (194 lb)   Height:   5' 2\" (1.575 m)      Temp (24hrs), Av.8 °F (36.6 °C), Min:97.5 °F (36.4 °C), Max:98 °F (36.7 °C)     BP  Min: 107/61  Max: 137/80      Physical Exam  General: in NAD  HEENT: normocephalic     Abdomen: Gravid, soft, nontender, no abnormal masses, rebound, rigidity, guarding  Fundus: soft, nontender  Pelvic:Cervical Exam: 2/70/-2  Uterine Activity: no contractions detected  Membranes: no gross evidence of ROM  Fetal Heart Rate: 130's adequate variability and reactivity; no significant abnormal decelerations     Labs:   Recent Results   No results found for this or any previous visit (from the past 24 hour(s)). There is no problem list on file for this patient. US at bedside- vertex, normal appearing fluid, anterior placenta- normal appearance, no obvious abruption, placental lakes. Uterus intact. Assessment and Plan:         G1 @ 38wks 3d presents with contractions     IUP- category  1     2.  Contractions, false labor- no cervical change since yesterday, no contractions on tocometer or appreciated on palpations, but pt reporting significant pain. Will send CBC and fibrinogen, not tonically contracted, no bleeding, patient is writhing about the bed and difficult to assess. Discussed no cervical change, will give fluids, pain medication as requested and reassess. Signed By:  Orlin Huynh MD      2022                     H&P by Tab Jensen MD at 22 8030 documented on Admission (Discharged) from 11/3/2022 in [facility] LD TRIAGE    Author: Tab Jensen MD Author Type: Physician Filed: 22   Note Status: Signed Cosign: Cosign Not Required Date of Service: 22   : Tab Jensen MD (Physician)               OB History & Physical     Name: Gustabo Stapleton MRN: [id number]  SSN: [id number]    YOB: 1995  Age: 33 y.o. Sex: female       Subjective:      Reason for Admission:  Pregnancy and contractions     History of Present Illness: Gustabo Stapleton is a 33 y.o.  female with an estimated gestational age of 36w4d with Estimated Date of Delivery: 11/15/22. Patient complains of contractions since early this morning. She denies bleeding, ROM. The baby has been active.   Problems with this pregnancy:  Hx HSV, no lesions, sx, valtrex suppression  Anxiety/depression---paxil     OB History            1    Para        Term                AB        Living               SAB        IAB        Ectopic        Molar        Multiple        Live Births                         Past Medical History:   Diagnosis Date    Genital herpes      Psychiatric problem              Past Surgical History:   Procedure Laterality Date    HX TONSIL AND ADENOIDECTOMY          Social History           Occupational History    Not on file   Tobacco Use    Smoking status: Never    Smokeless tobacco: Never   Vaping Use    Vaping Use: Never used   Substance and Sexual Activity    Alcohol use: No    Drug use: No    Sexual activity: Not on file History reviewed. No pertinent family history. SH:  Patient denies tobacco, alcohol, recreational drugs. . FH: reviewed and negative     Review of systems:     General: Denies fever, sweats, chills  CV: Denies CP, palpitations  Resp: Denies SOB, cough  GI: Denies nausea, vomiting, diarrhea  : Denies dysura, abnormal frequency, hematuria  Neuro: Denies HA, visual disturbance  All other systems reviewed and are negative          Allergies   Allergen Reactions    Pecan Nut Anaphylaxis    Codeine Nausea and Vomiting              Prior to Admission medications    Medication Sig Start Date End Date Taking? Authorizing Provider   valACYclovir (Valtrex) 500 mg tablet Take 500 mg by mouth daily. Yes Provider, Historical   FLUoxetine (PROzac) 20 mg capsule Take  by mouth daily. Yes Provider, Historical   PNV Comb #2-Iron-FA-Omega 3 29-1-400 mg cmpk Take  by mouth. Yes Provider, Historical   ondansetron (ZOFRAN ODT) 4 mg disintegrating tablet Take 1 Tab by mouth every eight (8) hours as needed for Nausea. 1/15/18   Yes CHARITY Devries   PARoxetine (PAXIL) 10 mg tablet Take 20 mg by mouth daily. Patient not taking: Reported on 11/3/2022       Other, MD Kash   HYDROcodone-acetaminophen (NORCO) 5-325 mg per tablet Take 1 Tab by mouth every four (4) hours as needed for Pain. Max Daily Amount: 6 Tabs. Patient not taking: Reported on 11/3/2022 1/15/18     CHARITY Ronquillo   omeprazole (PRILOSEC) 10 mg capsule Take 1 Cap by mouth daily. Patient not taking: Reported on 11/3/2022 1/15/18     CHARITY Ronquillo   cefdinir (OMNICEF) 300 mg capsule Take 600 mg by mouth daily. Patient not taking: Reported on 11/3/2022       Other, MD Kash   norgestimate-ethinyl estradiol (ORTHO-CYCLEN) 0.25-35 mg-mcg per tablet Take 1 Tab by mouth daily.   Patient not taking: Reported on 11/3/2022       Other, MD Kash   amoxicillin-clavulanate (AUGMENTIN) 875-125 mg per tablet Take 1 Tab by mouth two (2) times a day. Patient not taking: Reported on 11/3/2022 5/15/13     Prashant Braswell MD         Objective:      Vitals:        Vitals:     22 0548   BP: 137/80   Pulse: (!) 101   Temp: 98 °F (36.7 °C)   SpO2: 99%   Weight: 88 kg (194 lb)   Height: 5' 2\" (1.575 m)      Temp (24hrs), Av °F (36.7 °C), Min:98 °F (36.7 °C), Max:98 °F (36.7 °C)     BP  Min: 137/80  Max: 137/80      Physical Exam  General: in NAD, psychologically stable  Neck: normal ROM  Back: no CVAT  Heart: regular rate and rhythm  Respiratory: unlabored breathing  Abdomen: Gravid, soft, nontender, no abnormal masses, rebound, rigidity, guarding  Fundus: soft, nontender  Extremities: no abnormal cyanosis, clubbing, edema  Skin: warm, dry, no abnormal lesions noted  Neurological: DTR's 1-2+ no clonus  Pelvic:Cervical Exam: 2/70/-2/mid/soft/vertex  Uterine Activity: irregular contractions. mild  Membranes: no gross evidence of ROM  Fetal Heart Rate: adequate variability and reactivity; no significant abnormal decelerations     The patient's prenatal record is reviewed, including notes and diagnostic results, laboratory findings and ultrasound findings.      Assessment and Plan:      38 week IUP, false labor  Discharge home  Follow-up in the office as scheduled tomorrow  Precautions discussed; questions answered     Signed By:  Livier Jamison MD      November 3, 2022

## 2023-07-22 ENCOUNTER — HOSPITAL ENCOUNTER (EMERGENCY)
Facility: HOSPITAL | Age: 28
Discharge: HOME | End: 2023-07-23
Payer: MEDICAID

## 2023-07-22 DIAGNOSIS — N75.1 ABSCESS OF LEFT BARTHOLIN'S GLAND: Primary | ICD-10-CM

## 2023-07-22 PROCEDURE — 2500000003 HC RX 250 WO HCPCS: Performed by: PHYSICIAN ASSISTANT

## 2023-07-22 PROCEDURE — 6360000002 HC RX W HCPCS: Performed by: PHYSICIAN ASSISTANT

## 2023-07-22 PROCEDURE — 56405 I&D VULVA/PERINEAL ABSCESS: CPT

## 2023-07-22 PROCEDURE — 99283 EMERGENCY DEPT VISIT LOW MDM: CPT

## 2023-07-22 RX ORDER — BUPIVACAINE HYDROCHLORIDE 5 MG/ML
10 INJECTION, SOLUTION EPIDURAL; INTRACAUDAL
Status: COMPLETED | OUTPATIENT
Start: 2023-07-22 | End: 2023-07-22

## 2023-07-22 RX ORDER — LIDOCAINE HYDROCHLORIDE AND EPINEPHRINE 20; 5 MG/ML; UG/ML
10 INJECTION, SOLUTION EPIDURAL; INFILTRATION; INTRACAUDAL; PERINEURAL ONCE
Status: COMPLETED | OUTPATIENT
Start: 2023-07-22 | End: 2023-07-22

## 2023-07-22 RX ADMIN — BUPIVACAINE HYDROCHLORIDE 50 MG: 5 INJECTION, SOLUTION EPIDURAL; INTRACAUDAL; PERINEURAL at 23:58

## 2023-07-22 RX ADMIN — LIDOCAINE HYDROCHLORIDE,EPINEPHRINE BITARTRATE 10 ML: 20; .005 INJECTION, SOLUTION EPIDURAL; INFILTRATION; INTRACAUDAL; PERINEURAL at 23:58

## 2023-07-22 ASSESSMENT — LIFESTYLE VARIABLES
HOW OFTEN DO YOU HAVE A DRINK CONTAINING ALCOHOL: NEVER
HOW MANY STANDARD DRINKS CONTAINING ALCOHOL DO YOU HAVE ON A TYPICAL DAY: PATIENT DOES NOT DRINK

## 2023-07-22 ASSESSMENT — PAIN SCALES - GENERAL: PAINLEVEL_OUTOF10: 8

## 2023-07-23 VITALS
BODY MASS INDEX: 34.32 KG/M2 | HEART RATE: 96 BPM | WEIGHT: 186.51 LBS | DIASTOLIC BLOOD PRESSURE: 56 MMHG | SYSTOLIC BLOOD PRESSURE: 132 MMHG | RESPIRATION RATE: 20 BRPM | OXYGEN SATURATION: 94 % | HEIGHT: 62 IN | TEMPERATURE: 98.8 F

## 2023-07-23 PROCEDURE — 6370000000 HC RX 637 (ALT 250 FOR IP): Performed by: PHYSICIAN ASSISTANT

## 2023-07-23 RX ORDER — ONDANSETRON 4 MG/1
4 TABLET, ORALLY DISINTEGRATING ORAL EVERY 8 HOURS PRN
Status: DISCONTINUED | OUTPATIENT
Start: 2023-07-23 | End: 2023-07-23

## 2023-07-23 RX ORDER — CEPHALEXIN 500 MG/1
500 CAPSULE ORAL 4 TIMES DAILY
Qty: 28 CAPSULE | Refills: 0 | Status: SHIPPED | OUTPATIENT
Start: 2023-07-23 | End: 2023-07-30

## 2023-07-23 RX ORDER — ONDANSETRON 4 MG/1
4 TABLET, ORALLY DISINTEGRATING ORAL
Status: COMPLETED | OUTPATIENT
Start: 2023-07-23 | End: 2023-07-23

## 2023-07-23 RX ADMIN — ONDANSETRON 4 MG: 4 TABLET, ORALLY DISINTEGRATING ORAL at 00:13

## 2023-07-23 NOTE — DISCHARGE INSTRUCTIONS
It was a pleasure taking care of you at [facility] today. We know that when you come to [facility], you are entrusting us with your health, comfort, and safety. Our physicians and nurses honor that trust, and we truly appreciate the opportunity to care for you and your loved ones. We also value your feedback. If you receive a survey about your Emergency Department experience today, please fill it out. We care about our patients' feedback, and we listen to what you have to say. Thank you!

## 2023-07-23 NOTE — ED NOTES
MARY Byrne to bedside for cyst drainage. Pt reports zero pain at this time.       Marya Beard, [place]  07/23/23 [id number]

## 2023-07-23 NOTE — ED NOTES
MARY Byrne at bedside with this RN. Pt tolerated numbing procedure.       Sunitha Cowan, CANDI  07/23/23 0003       Zhao Wang RN  07/23/23 2578

## 2023-07-23 NOTE — ED NOTES
Patient discharged by Kenia Tucker  / PA / NP - pt sent to the front lobby, with strong and steady gait, no acute distress noted at time of discharge - Discharge information / home RX / and reasons to return to the ED were reviewed by the ED provider.          Ricci Cifuentes RN  07/23/23 1399

## 2023-07-23 NOTE — ED PROVIDER NOTES
[facility] EMERGENCY DEPT  EMERGENCY DEPARTMENT ENCOUNTER       Pt Name: Negro Dunham  MRN: [id number]  [address] [facility] 1995  Date of evaluation: 7/22/2023  Provider: MARY Woods   PCP: Ana Rooney MD  Note Started: 12:07 AM EDT 7/23/23     CHIEF COMPLAINT       Chief Complaint   Patient presents with    Cyst     Patient ambulatory to triage w c/o cyst to vaginal region onset 2 weeks ago. HISTORY OF PRESENT ILLNESS: 1 or more elements      History From: Patient  HPI Limitations: None     Negro Dunham is a 33 y.o. female who presents by POV with complaints of a cyst to her left labia. This started about 2 weeks ago and has progressively increased in size and pain since onset. She reports a history of a prior Bartholin abscess, which was drained in the ED, about 1 year ago while she was pregnant with her first child. Patient is currently pregnant again. She is 25 weeks gestation. She denies issues or complications with current pregnancy. She reports that she has had nausea with this pregnancy and the pain has worsened to the nausea. She denies vomiting at this time. There is been no fever, chills, or urinary complaint. She does report that she feels the baby moving and has no concerns with pregnancy at this time. Nursing Notes were all reviewed and agreed with or any disagreements were addressed in the HPI. REVIEW OF SYSTEMS      Review of Systems     Positives and Pertinent negatives as per HPI. PAST HISTORY     Past Medical History:  Past Medical History:   Diagnosis Date    Genital herpes     Psychiatric problem        Past Surgical History:  Past Surgical History:   Procedure Laterality Date    TONSILLECTOMY AND ADENOIDECTOMY         Family History:  No family history on file. Social History:  Social History     Tobacco Use    Smoking status: Never    Smokeless tobacco: Never   Substance Use Topics    Alcohol use: No    Drug use: No       Allergies:   Allergies   Allergen Reactions

## (undated) DEVICE — STAPLER SKN INSORB 30 COUNT --

## (undated) DEVICE — STERILE POLYISOPRENE POWDER-FREE SURGICAL GLOVES: Brand: PROTEXIS

## (undated) DEVICE — STRAP,POSITIONING,KNEE/BODY,FOAM,4X60": Brand: MEDLINE

## (undated) DEVICE — REM POLYHESIVE ADULT PATIENT RETURN ELECTRODE: Brand: VALLEYLAB

## (undated) DEVICE — (D)PREP SKN CHLRAPRP APPL 26ML -- CONVERT TO ITEM 371833

## (undated) DEVICE — SOLIDIFIER FLD 2OZ 1500CC N DISINF IN BTL DISP SAFESORB

## (undated) DEVICE — PENCIL ES L3M BTTN SWCH S STL HEX LOK BLDE ELECTRD HOLSTER

## (undated) DEVICE — SUTURE VCRL SZ 2-0 L36IN ABSRB VLT L36MM CT-1 1/2 CIR J345H

## (undated) DEVICE — TIP CLEANER: Brand: VALLEYLAB

## (undated) DEVICE — C-SECTION II-LF: Brand: MEDLINE INDUSTRIES, INC.

## (undated) DEVICE — MEDI-VAC NON-CONDUCTIVE SUCTION TUBING: Brand: CARDINAL HEALTH

## (undated) DEVICE — STERILE POLYISOPRENE POWDER-FREE SURGICAL GLOVES WITH EMOLLIENT COATING: Brand: PROTEXIS

## (undated) DEVICE — LARGE, DISPOSABLE ALEXIS O C-SECTION PROTECTOR - RETRACTOR: Brand: ALEXIS ® O C-SECTION PROTECTOR - RETRACTOR

## (undated) DEVICE — SUTURE VCRL SZ 0 L36IN ABSRB VLT L40MM CT 1/2 CIR J358H

## (undated) DEVICE — SOLUTION IV 1000ML 0.9% SOD CHL

## (undated) DEVICE — ADHESIVE TISS DERMA FLEX 0.7ML -- HIGH VISCOSITY

## (undated) DEVICE — SUTURE STRATAFIX SYMMETRIC SZ 1 L18IN ABSRB VLT CT1 L36CM SXPP1A404

## (undated) DEVICE — 3000CC GUARDIAN II: Brand: GUARDIAN